# Patient Record
Sex: FEMALE | Race: WHITE | NOT HISPANIC OR LATINO | Employment: FULL TIME | URBAN - METROPOLITAN AREA
[De-identification: names, ages, dates, MRNs, and addresses within clinical notes are randomized per-mention and may not be internally consistent; named-entity substitution may affect disease eponyms.]

---

## 2019-11-14 ENCOUNTER — APPOINTMENT (OUTPATIENT)
Dept: ULTRASOUND IMAGING | Facility: CLINIC | Age: 29
End: 2019-11-14
Attending: EMERGENCY MEDICINE
Payer: COMMERCIAL

## 2019-11-14 ENCOUNTER — HOSPITAL ENCOUNTER (EMERGENCY)
Facility: CLINIC | Age: 29
Discharge: HOME | End: 2019-11-14
Attending: EMERGENCY MEDICINE | Admitting: EMERGENCY MEDICINE
Payer: COMMERCIAL

## 2019-11-14 ENCOUNTER — APPOINTMENT (OUTPATIENT)
Dept: CT IMAGING | Facility: CLINIC | Age: 29
End: 2019-11-14
Attending: EMERGENCY MEDICINE
Payer: COMMERCIAL

## 2019-11-14 VITALS
HEIGHT: 64 IN | OXYGEN SATURATION: 98 % | TEMPERATURE: 97.9 F | HEART RATE: 67 BPM | WEIGHT: 119 LBS | DIASTOLIC BLOOD PRESSURE: 56 MMHG | RESPIRATION RATE: 18 BRPM | BODY MASS INDEX: 20.32 KG/M2 | SYSTOLIC BLOOD PRESSURE: 99 MMHG

## 2019-11-14 DIAGNOSIS — R10.84 ABDOMINAL PAIN, GENERALIZED: ICD-10-CM

## 2019-11-14 LAB
ALBUMIN SERPL-MCNC: 4.5 G/DL (ref 3.4–5)
ALBUMIN UR-MCNC: NEGATIVE MG/DL
ALP SERPL-CCNC: 49 U/L (ref 40–150)
ALT SERPL W P-5'-P-CCNC: 13 U/L (ref 0–50)
ANION GAP SERPL CALCULATED.3IONS-SCNC: 3 MMOL/L (ref 3–14)
APPEARANCE UR: CLEAR
AST SERPL W P-5'-P-CCNC: 11 U/L (ref 0–45)
BASOPHILS # BLD AUTO: 0 10E9/L (ref 0–0.2)
BASOPHILS NFR BLD AUTO: 0.4 %
BILIRUB SERPL-MCNC: 0.5 MG/DL (ref 0.2–1.3)
BILIRUB UR QL STRIP: NEGATIVE
BUN SERPL-MCNC: 11 MG/DL (ref 7–30)
CALCIUM SERPL-MCNC: 9.6 MG/DL (ref 8.5–10.1)
CHLORIDE SERPL-SCNC: 105 MMOL/L (ref 94–109)
CO2 SERPL-SCNC: 29 MMOL/L (ref 20–32)
COLOR UR AUTO: ABNORMAL
CREAT SERPL-MCNC: 0.81 MG/DL (ref 0.52–1.04)
DIFFERENTIAL METHOD BLD: NORMAL
EOSINOPHIL # BLD AUTO: 0 10E9/L (ref 0–0.7)
EOSINOPHIL NFR BLD AUTO: 0.4 %
ERYTHROCYTE [DISTWIDTH] IN BLOOD BY AUTOMATED COUNT: 11.4 % (ref 10–15)
GFR SERPL CREATININE-BSD FRML MDRD: >90 ML/MIN/{1.73_M2}
GLUCOSE SERPL-MCNC: 120 MG/DL (ref 70–99)
GLUCOSE UR STRIP-MCNC: NEGATIVE MG/DL
HCG SERPL QL: NEGATIVE
HCT VFR BLD AUTO: 44.5 % (ref 35–47)
HGB BLD-MCNC: 15.1 G/DL (ref 11.7–15.7)
HGB UR QL STRIP: NEGATIVE
HYALINE CASTS #/AREA URNS LPF: 1 /LPF (ref 0–2)
IMM GRANULOCYTES # BLD: 0 10E9/L (ref 0–0.4)
IMM GRANULOCYTES NFR BLD: 0.3 %
KETONES UR STRIP-MCNC: 20 MG/DL
LACTATE BLD-SCNC: 1.8 MMOL/L (ref 0.7–2)
LEUKOCYTE ESTERASE UR QL STRIP: NEGATIVE
LYMPHOCYTES # BLD AUTO: 2.7 10E9/L (ref 0.8–5.3)
LYMPHOCYTES NFR BLD AUTO: 33.7 %
MCH RBC QN AUTO: 30.8 PG (ref 26.5–33)
MCHC RBC AUTO-ENTMCNC: 33.9 G/DL (ref 31.5–36.5)
MCV RBC AUTO: 91 FL (ref 78–100)
MONOCYTES # BLD AUTO: 0.5 10E9/L (ref 0–1.3)
MONOCYTES NFR BLD AUTO: 5.9 %
NEUTROPHILS # BLD AUTO: 4.8 10E9/L (ref 1.6–8.3)
NEUTROPHILS NFR BLD AUTO: 59.3 %
NITRATE UR QL: NEGATIVE
NRBC # BLD AUTO: 0 10*3/UL
NRBC BLD AUTO-RTO: 0 /100
PH UR STRIP: 6.5 PH (ref 5–7)
PLATELET # BLD AUTO: 274 10E9/L (ref 150–450)
POTASSIUM SERPL-SCNC: 3.6 MMOL/L (ref 3.4–5.3)
PROT SERPL-MCNC: 8.1 G/DL (ref 6.8–8.8)
RBC # BLD AUTO: 4.9 10E12/L (ref 3.8–5.2)
RBC #/AREA URNS AUTO: 1 /HPF (ref 0–2)
SODIUM SERPL-SCNC: 137 MMOL/L (ref 133–144)
SOURCE: ABNORMAL
SP GR UR STRIP: 1.01 (ref 1–1.03)
UROBILINOGEN UR STRIP-MCNC: NORMAL MG/DL (ref 0–2)
WBC # BLD AUTO: 8 10E9/L (ref 4–11)
WBC #/AREA URNS AUTO: <1 /HPF (ref 0–5)

## 2019-11-14 PROCEDURE — 84703 CHORIONIC GONADOTROPIN ASSAY: CPT | Performed by: EMERGENCY MEDICINE

## 2019-11-14 PROCEDURE — 25000128 H RX IP 250 OP 636: Performed by: EMERGENCY MEDICINE

## 2019-11-14 PROCEDURE — 25800030 ZZH RX IP 258 OP 636: Performed by: EMERGENCY MEDICINE

## 2019-11-14 PROCEDURE — 85025 COMPLETE CBC W/AUTO DIFF WBC: CPT | Performed by: EMERGENCY MEDICINE

## 2019-11-14 PROCEDURE — 80053 COMPREHEN METABOLIC PANEL: CPT | Performed by: EMERGENCY MEDICINE

## 2019-11-14 PROCEDURE — 96374 THER/PROPH/DIAG INJ IV PUSH: CPT | Mod: 59

## 2019-11-14 PROCEDURE — 83605 ASSAY OF LACTIC ACID: CPT | Performed by: EMERGENCY MEDICINE

## 2019-11-14 PROCEDURE — 25000125 ZZHC RX 250: Performed by: EMERGENCY MEDICINE

## 2019-11-14 PROCEDURE — 93976 VASCULAR STUDY: CPT

## 2019-11-14 PROCEDURE — 99285 EMERGENCY DEPT VISIT HI MDM: CPT | Mod: 25

## 2019-11-14 PROCEDURE — 96376 TX/PRO/DX INJ SAME DRUG ADON: CPT

## 2019-11-14 PROCEDURE — 81001 URINALYSIS AUTO W/SCOPE: CPT | Performed by: EMERGENCY MEDICINE

## 2019-11-14 PROCEDURE — 96375 TX/PRO/DX INJ NEW DRUG ADDON: CPT

## 2019-11-14 PROCEDURE — 96361 HYDRATE IV INFUSION ADD-ON: CPT

## 2019-11-14 PROCEDURE — 74177 CT ABD & PELVIS W/CONTRAST: CPT

## 2019-11-14 RX ORDER — HYDROCODONE BITARTRATE AND ACETAMINOPHEN 5; 325 MG/1; MG/1
1 TABLET ORAL EVERY 6 HOURS PRN
Qty: 10 TABLET | Refills: 0 | Status: SHIPPED | OUTPATIENT
Start: 2019-11-14 | End: 2019-11-17

## 2019-11-14 RX ORDER — IOPAMIDOL 755 MG/ML
500 INJECTION, SOLUTION INTRAVASCULAR ONCE
Status: COMPLETED | OUTPATIENT
Start: 2019-11-14 | End: 2019-11-14

## 2019-11-14 RX ORDER — MORPHINE SULFATE 4 MG/ML
4 INJECTION, SOLUTION INTRAMUSCULAR; INTRAVENOUS
Status: DISCONTINUED | OUTPATIENT
Start: 2019-11-14 | End: 2019-11-14 | Stop reason: HOSPADM

## 2019-11-14 RX ORDER — ONDANSETRON 2 MG/ML
4 INJECTION INTRAMUSCULAR; INTRAVENOUS EVERY 30 MIN PRN
Status: COMPLETED | OUTPATIENT
Start: 2019-11-14 | End: 2019-11-14

## 2019-11-14 RX ORDER — ONDANSETRON 4 MG/1
4 TABLET, ORALLY DISINTEGRATING ORAL EVERY 6 HOURS PRN
Qty: 10 TABLET | Refills: 0 | Status: SHIPPED | OUTPATIENT
Start: 2019-11-14 | End: 2019-11-17

## 2019-11-14 RX ADMIN — ONDANSETRON HYDROCHLORIDE 4 MG: 2 INJECTION, SOLUTION INTRAMUSCULAR; INTRAVENOUS at 02:07

## 2019-11-14 RX ADMIN — SODIUM CHLORIDE 55 ML: 9 INJECTION, SOLUTION INTRAVENOUS at 02:59

## 2019-11-14 RX ADMIN — MORPHINE SULFATE 4 MG: 4 INJECTION INTRAVENOUS at 04:20

## 2019-11-14 RX ADMIN — MORPHINE SULFATE 4 MG: 4 INJECTION INTRAVENOUS at 02:07

## 2019-11-14 RX ADMIN — ONDANSETRON HYDROCHLORIDE 4 MG: 2 INJECTION, SOLUTION INTRAMUSCULAR; INTRAVENOUS at 04:19

## 2019-11-14 RX ADMIN — IOPAMIDOL 60 ML: 755 INJECTION, SOLUTION INTRAVENOUS at 02:58

## 2019-11-14 RX ADMIN — SODIUM CHLORIDE 1000 ML: 9 INJECTION, SOLUTION INTRAVENOUS at 02:07

## 2019-11-14 RX ADMIN — ONDANSETRON HYDROCHLORIDE 4 MG: 2 INJECTION, SOLUTION INTRAMUSCULAR; INTRAVENOUS at 03:47

## 2019-11-14 ASSESSMENT — ENCOUNTER SYMPTOMS
DYSURIA: 0
NAUSEA: 1
ABDOMINAL PAIN: 1
FREQUENCY: 1
HEMATURIA: 0

## 2019-11-14 ASSESSMENT — MIFFLIN-ST. JEOR: SCORE: 1259.78

## 2019-11-14 NOTE — LETTER
November 14, 2019      To Whom It May Concern:      Bindu Mcleod was seen in our Emergency Department today, 11/14/19.  I expect her condition to improve over the next 2 days.  She may return to work when improved.    Sincerely,        KAYLA Lei

## 2019-11-14 NOTE — ED AVS SNAPSHOT
[facility] Emergency Department  [address]  [facility] [address]  Phone:  [phone]  Fax:  [phone]                                    Bindu Mcleod   MRN: [id number]    Department:  [facility] Emergency Department   Date of Visit:  11/14/2019           After Visit Summary Signature Page    I have received my discharge instructions, and my questions have been answered. I have discussed any challenges I see with this plan with the nurse or doctor.    ..........................................................................................................................................  Patient/Patient Representative Signature      ..........................................................................................................................................  Patient Representative Print Name and Relationship to Patient    ..................................................               ................................................  Date                                   Time    ..........................................................................................................................................  Reviewed by Signature/Title    ...................................................              ..............................................  Date                                               Time          22EPIC Rev 08/18

## 2019-11-14 NOTE — ED PROVIDER NOTES
"  History     Chief Complaint:  Abdominal Pain     HPI   Bindu Mcleod is a 29 year old female with a history of ovarian cyst rupture who presents with abdominal pain. At 2045 on 11/13 the patient reported abdominal pain in her right lower quadrant with associated nausea. She took ibuprofen at 2000 and Tums and Pepto bismol at 2100. Currently, her pain is located diffusely throughout her abdomen, mostly on her right side. She notes urinary frequency that began last night. She denies hematuria and dysuria. She states that she has had no abdominal surgeries.     Allergies:  Azithromycin    Medications:    The patient is not currently taking any prescribed medications.    Past Medical History:    Anxiety  Depression  Heart murmur  Ovarian cyst rupture    Past Surgical History:    Tonsillectomy   Eye surgery    Family History:    No past pertinent family history.    Social History:  Smoking Status: former smoker   Smokeless Tobacco: Never Used  Alcohol Use: Yes  Drug Use: No  PCP: Nelsy Ramirez  Marital Status:  Single    Review of Systems   Gastrointestinal: Positive for abdominal pain and nausea.   Genitourinary: Positive for frequency. Negative for dysuria and hematuria.   All other systems reviewed and are negative.        Physical Exam     Patient Vitals for the past 24 hrs:   BP Temp Temp src Pulse Heart Rate Resp SpO2 Height Weight   11/14/19 0430 104/60 -- -- 69 -- -- 100 % -- --   11/14/19 0425 106/65 -- -- 73 -- -- 100 % -- --   11/14/19 0420 -- -- -- -- -- -- 100 % -- --   11/14/19 0415 105/66 -- -- 69 -- -- 100 % -- --   11/14/19 0405 107/65 -- -- 67 -- -- 100 % -- --   11/14/19 0240 -- -- -- -- -- -- 100 % -- --   11/14/19 0140 127/66 97.9  F (36.6  C) Oral 75 75 18 99 % 1.626 m (5' 4\") 54 kg (119 lb)       Physical Exam  Constitutional:       Appearance: She is well-developed.   HENT:      Right Ear: External ear normal.      Left Ear: External ear normal.      Mouth/Throat:      Mouth: Mucous " membranes are moist.      Pharynx: Oropharynx is clear. No oropharyngeal exudate.   Eyes:      General: No scleral icterus.     Conjunctiva/sclera: Conjunctivae normal.      Pupils: Pupils are equal, round, and reactive to light.   Neck:      Musculoskeletal: Normal range of motion and neck supple.   Cardiovascular:      Rate and Rhythm: Normal rate and regular rhythm.      Pulses: Normal pulses.      Heart sounds: Normal heart sounds. No murmur. No friction rub. No gallop.    Pulmonary:      Effort: Pulmonary effort is normal. No respiratory distress.      Breath sounds: Normal breath sounds. No wheezing or rales.   Abdominal:      General: Bowel sounds are normal. There is no distension.      Palpations: Abdomen is soft. There is no mass.      Tenderness: There is generalized abdominal tenderness and tenderness in the right upper quadrant, right lower quadrant, periumbilical area, suprapubic area and left lower quadrant.   Skin:     General: Skin is warm and dry.      Capillary Refill: Capillary refill takes less than 2 seconds.      Findings: No rash.   Neurological:      General: No focal deficit present.      Mental Status: She is alert.   Psychiatric:         Mood and Affect: Mood normal.             Emergency Department Course     Imaging:  Radiology findings were communicated with the patient who voiced understanding of the findings.    US Pelvic Complete w Transvaginal & Abd/Pel Duplex Limited  1.  Trace of pelvic free fluid.  2.  Left ovary obscured by bowel gas.  Reading per radiology    CT Abdomen Pelvis w Contrast  1.  Equivocal rectal wall thickening. This may be physiologic but mild proctitis is difficult to completely exclude.  2.  Trace free fluid in the pelvis. No other acute cause of pain identified.  Reading per radiology    Laboratory:  Laboratory findings were communicated with the patient who voiced understanding of the findings.    CBC: WBC 8.0, HGB 15.1,   CMP: glucose 120 (H) o/w WNL  (Creatinine 0.81)  Lactic acid: 1.8  HCG qualitative: negative    UA with microscopic: urineketon 20 o/w WNL    Interventions:  0207 NS bolus 1,000 ml IV  0207 zofran 4 mg IV  0207 morphine 4 mg IV  0347 zofran 4 mg IV  0419 zofran 4 mg IV  0420 morphine 4 mg IV    Emergency Department Course:  Nursing notes and vitals reviewed.    0142 I performed an exam of the patient as documented above.     The patient provided a urine sample here in the emergency department. This was sent for laboratory testing, findings above.    IV was inserted and blood was drawn for laboratory testing, results above.    The patient was sent for a US pelvic complete and CT abdomen and pelvis while in the emergency department, results above.     0451 I returned to update the patient regarding their discharge.    Findings and plan explained to the Patient. Patient discharged home with instructions regarding supportive care, medications, and reasons to return. The importance of close follow-up was reviewed. The patient was prescribed norco and zofran.     Impression & Plan      Medical Decision Making:  Bindu Mcleod is a 29 year old female who presents to the emergency department today for evaluation of abdominal pain and nausea. She pointed to her right side but on exam it was diffuse right and left sided abdominal pain. Labs were reassuring the CBC, CMP, and UA are all completely clean. The CT and US were negative. I am not exactly sure what is causing the pain but her symptoms have improved and she was asleep on repeat evaluation. The patient is instructed to follow up with her primary care provider tomorrow and she is prescribed zofran and norco for the pain. She is to keep close eye on the return precautions that were given.     Diagnosis:    ICD-10-CM    1. Abdominal pain, generalized R10.84        Disposition:   The patient is discharged to home.    Discharge Medications:  New Prescriptions    HYDROCODONE-ACETAMINOPHEN (NORCO)  5-325 MG TABLET    Take 1 tablet by mouth every 6 hours as needed for severe pain    ONDANSETRON (ZOFRAN ODT) 4 MG ODT TAB    Take 1 tablet (4 mg) by mouth every 6 hours as needed for nausea       Scribe Disclosure:  SENG, Flor Luther, am serving as a scribe at 1:53 AM on 11/14/2019 to document services personally performed by Rachael Storey MD based on my observations and the provider's statements to me.   [facility] EMERGENCY DEPARTMENT       Rachael Storey MD  11/14/19 0544

## 2019-11-14 NOTE — ED TRIAGE NOTES
Here for right lower abdominal started around 8:45pm that got worse associated with nausea, light headedness, and chills. Pain improve with laying on left side. ABCs intact.

## 2019-11-14 NOTE — DISCHARGE INSTRUCTIONS
Zofran as needed  Opioid Medication Information    You have been given a prescription for an opioid (narcotic) pain medicine and/or have received a pain medicine while here in the Emergency Department. These medicines can make you drowsy or impaired. You must not drive, operate dangerous equipment, or engage in any other dangerous activities while taking these medications. If you drive while taking these medications, you could be arrested for DUI, or driving under the influence. Do not drink any alcohol while you are taking these medications.   Opioid pain medications can cause addiction. If you have a history of chemical dependency of any type, you are at a higher risk of becoming addicted to pain medications.  Only take these prescribed medications to treat your pain when all other options have been tried. Take it for as short a time and as few doses as possible. Store your pain pills in a secure place, as they are frequently stolen and provide a dangerous opportunity for children or visitors in your house to start abusing these powerful medications. We will not replace any lost or stolen medicine.  As soon as your pain is better, you should flush all your remaining medication.   Many prescription pain medications contain Tylenol  (acetaminophen), including Vicodin , Tylenol #3 , Norco , Lortab , and Percocet .  You should not take any extra pills of Tylenol  if you are using these prescription medications or you can get very sick.  Do not ever take more than 4000 mg of acetaminophen in any 24 hour period.  All opioids tend to cause constipation. Drink plenty of water and eat foods that have a lot of fiber, such as fruits, vegetables, prune juice, apple juice and high fiber cereal.  Take a laxative if you don t move your bowels at least every other day. Miralax , Milk of Magnesia, Colace , or Senna  can be used to keep you regular.

## 2022-12-11 ENCOUNTER — HOSPITAL ENCOUNTER (EMERGENCY)
Facility: CLINIC | Age: 32
Discharge: HOME | End: 2022-12-11
Attending: PHYSICIAN ASSISTANT | Admitting: PHYSICIAN ASSISTANT
Payer: COMMERCIAL

## 2022-12-11 VITALS
BODY MASS INDEX: 20.6 KG/M2 | OXYGEN SATURATION: 97 % | SYSTOLIC BLOOD PRESSURE: 109 MMHG | DIASTOLIC BLOOD PRESSURE: 52 MMHG | WEIGHT: 120 LBS | TEMPERATURE: 98.8 F | RESPIRATION RATE: 18 BRPM | HEART RATE: 91 BPM

## 2022-12-11 DIAGNOSIS — J10.1 INFLUENZA A: ICD-10-CM

## 2022-12-11 LAB
FLUAV RNA SPEC QL NAA+PROBE: POSITIVE
FLUBV RNA RESP QL NAA+PROBE: NEGATIVE
RSV RNA SPEC NAA+PROBE: NEGATIVE
SARS-COV-2 RNA RESP QL NAA+PROBE: NEGATIVE

## 2022-12-11 PROCEDURE — C9803 HOPD COVID-19 SPEC COLLECT: HCPCS

## 2022-12-11 PROCEDURE — 99283 EMERGENCY DEPT VISIT LOW MDM: CPT | Mod: CS

## 2022-12-11 PROCEDURE — 87637 SARSCOV2&INF A&B&RSV AMP PRB: CPT | Performed by: EMERGENCY MEDICINE

## 2022-12-11 RX ORDER — OSELTAMIVIR PHOSPHATE 75 MG/1
75 CAPSULE ORAL 2 TIMES DAILY
Qty: 10 CAPSULE | Refills: 0 | Status: SHIPPED | OUTPATIENT
Start: 2022-12-11 | End: 2022-12-16

## 2022-12-11 ASSESSMENT — ENCOUNTER SYMPTOMS
COUGH: 1
HEADACHES: 1

## 2022-12-11 ASSESSMENT — ACTIVITIES OF DAILY LIVING (ADL): ADLS_ACUITY_SCORE: 35

## 2022-12-12 NOTE — ED TRIAGE NOTES
Patient arrives complaining of fever, cough, and congestion with headache. Max fever 100 temp at home. Pt is 13 weeks pregnant. Not taking tylenol. Declines tylenol in triage.     No audible wheezes in triage.      Triage Assessment     Row Name 12/11/22 1911       Triage Assessment (Adult)    Airway WDL WDL       Respiratory WDL    Respiratory WDL X;cough  cough, congestion       Skin Circulation/Temperature WDL    Skin Circulation/Temperature WDL WDL       Cardiac WDL    Cardiac WDL WDL       Peripheral/Neurovascular WDL    Peripheral Neurovascular WDL WDL       Cognitive/Neuro/Behavioral WDL    Cognitive/Neuro/Behavioral WDL WDL

## 2022-12-12 NOTE — ED PROVIDER NOTES
History     Chief Complaint:  Cough       HPI   Bindu Mcleod is a 32 year old female who presents with headache and cough. Symptoms have been present for 2 days. Patient is 13 weeks pregnant with first child. No known pregnancy complications. Patient has not been taking any medication. Patient is able to tolerate liquids and is using bathroom regularly. No vomiting, diarrhea or rash. 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 She denies chest pain, shortness of breath, abdominal pain or cramps, back pain, or vaginal discharge.     ROS:  Review of Systems   Respiratory: Positive for cough.    Neurological: Positive for headaches.   All other systems reviewed and are negative.    Allergies:  Azithromycin     Medications:    oseltamivir (TAMIFLU) 75 MG capsule  ondansetron (ZOFRAN) 4 MG tablet        Past Medical History:    Past Medical History:   Diagnosis Date     Anxiety      Depression      Heart murmur      Ovarian cyst rupture        Past Surgical History:    Past Surgical History:   Procedure Laterality Date     EYE SURGERY       tonsilectomy          Family History:    family history is not on file.    Social History:   reports that she has quit smoking. She does not have any smokeless tobacco history on file. She reports current alcohol use. She reports that she does not use drugs.  PCP: Cara Figueroa     Physical Exam     Patient Vitals for the past 24 hrs:   BP Temp Temp src Pulse Resp SpO2 Weight   12/11/22 1911 119/69 98.8  F (37.1  C) Oral 117 20 98 % 54.4 kg (120 lb)        Physical Exam  Constitutional: Alert, attentive, GCS 15  HENT:    Nose: Nose normal.    Mouth/Throat: Oropharynx is clear, mucous membranes are moist   Eyes: EOM are normal.   CV: regular rate and rhythm; no murmurs, rubs or gallups  Chest: Effort normal and breath sounds normal. No wheezing or crackles. Dry cough.  GI:  There is no tenderness. No distension. Normal bowel sounds  MSK: Normal  range of motion. No lower extremity edema.  Neurological: Alert, attentive  Skin: Skin is warm and dry.      Emergency Department Course     Laboratory:  Labs Ordered and Resulted from Time of ED Arrival to Time of ED Departure   INFLUENZA A/B & SARS-COV2 PCR MULTIPLEX - Abnormal       Result Value    Influenza A PCR Positive (*)     Influenza B PCR Negative      RSV PCR Negative      SARS CoV2 PCR Negative          Emergency Department Course:    Reviewed:  I reviewed nursing notes, vitals and past medical history    Assessments:  2015 I obtained history and examined the patient as noted above. Laboratory results discussed.    Interventions:  Medications - No data to display     Disposition:  The patient was discharged to home.     Impression & Plan    CMS Diagnoses: None    Medical Decision Making:  Patient is a well appearing 32 year old F who is 13 weeks pregnant and presents with history and exam consistent with viral illness. Viral swab is positive for Influenza A.  There is no evidence of serious bacterial infection such as pneumonia or acute otitis media. No suspicious abdominal or vaginal signs or symptoms. There is no significant tachypnea, increased work of breathing, focal exam findings, or persistent symptoms to suggest pneumonia; I do not believe imaging is indicated at this time. The patient is afebrile in the department. She is well-hydrated, well-appearing, and I believe is safe for discharge with plan for supportive care. Patient is in high risk category due to pregnancy status and would benefit from Tamiflu. The patient may take Tylenol or ibuprofen for pain and fever. Patient has scheduled OB appointment tomorrow which is appropriate. Supportive cares and return precautions to ED discussed. Patient expressed understanding of plan and was ready for discharge.    Diagnosis:    ICD-10-CM    1. Influenza A  J10.1            Discharge Medications:  New Prescriptions    OSELTAMIVIR (TAMIFLU) 75 MG  CAPSULE    Take 1 capsule (75 mg) by mouth 2 times daily for 5 days        12/11/2022   Adriana Rai PA-C Steinbrueck, Emily, PA-C  12/11/22 0832

## 2023-02-20 ENCOUNTER — HOSPITAL ENCOUNTER (OUTPATIENT)
Facility: CLINIC | Age: 33
Discharge: HOME | End: 2023-02-20
Attending: OBSTETRICS & GYNECOLOGY | Admitting: OBSTETRICS & GYNECOLOGY
Payer: COMMERCIAL

## 2023-02-20 ENCOUNTER — HOSPITAL ENCOUNTER (OUTPATIENT)
Facility: CLINIC | Age: 33
End: 2023-02-20
Admitting: OBSTETRICS & GYNECOLOGY
Payer: COMMERCIAL

## 2023-02-20 VITALS
HEIGHT: 64 IN | WEIGHT: 127 LBS | DIASTOLIC BLOOD PRESSURE: 54 MMHG | SYSTOLIC BLOOD PRESSURE: 91 MMHG | TEMPERATURE: 98.1 F | RESPIRATION RATE: 18 BRPM | BODY MASS INDEX: 21.68 KG/M2

## 2023-02-20 LAB
ALBUMIN MFR UR ELPH: <6 MG/DL (ref 1–14)
ALBUMIN SERPL BCG-MCNC: 3.6 G/DL (ref 3.5–5.2)
ALP SERPL-CCNC: 50 U/L (ref 35–104)
ALT SERPL W P-5'-P-CCNC: 18 U/L (ref 10–35)
ANION GAP SERPL CALCULATED.3IONS-SCNC: 9 MMOL/L (ref 7–15)
AST SERPL W P-5'-P-CCNC: 14 U/L (ref 10–35)
BILIRUB SERPL-MCNC: <0.2 MG/DL
BUN SERPL-MCNC: 9.5 MG/DL (ref 6–20)
CALCIUM SERPL-MCNC: 8.9 MG/DL (ref 8.6–10)
CHLORIDE SERPL-SCNC: 103 MMOL/L (ref 98–107)
CREAT SERPL-MCNC: 0.63 MG/DL (ref 0.51–0.95)
CREAT UR-MCNC: 14.9 MG/DL
DEPRECATED HCO3 PLAS-SCNC: 26 MMOL/L (ref 22–29)
ERYTHROCYTE [DISTWIDTH] IN BLOOD BY AUTOMATED COUNT: 12.2 % (ref 10–15)
GFR SERPL CREATININE-BSD FRML MDRD: >90 ML/MIN/1.73M2
GLUCOSE SERPL-MCNC: 98 MG/DL (ref 70–99)
HCT VFR BLD AUTO: 37.7 % (ref 35–47)
HGB BLD-MCNC: 12.2 G/DL (ref 11.7–15.7)
MCH RBC QN AUTO: 31 PG (ref 26.5–33)
MCHC RBC AUTO-ENTMCNC: 32.4 G/DL (ref 31.5–36.5)
MCV RBC AUTO: 96 FL (ref 78–100)
PLATELET # BLD AUTO: 222 10E3/UL (ref 150–450)
POTASSIUM SERPL-SCNC: 4.4 MMOL/L (ref 3.4–5.3)
PROT SERPL-MCNC: 6.5 G/DL (ref 6.4–8.3)
PROT/CREAT 24H UR: NORMAL MG/G{CREAT}
RBC # BLD AUTO: 3.93 10E6/UL (ref 3.8–5.2)
SODIUM SERPL-SCNC: 138 MMOL/L (ref 136–145)
WBC # BLD AUTO: 7.6 10E3/UL (ref 4–11)

## 2023-02-20 PROCEDURE — 250N000013 HC RX MED GY IP 250 OP 250 PS 637: Performed by: OBSTETRICS & GYNECOLOGY

## 2023-02-20 PROCEDURE — G0463 HOSPITAL OUTPT CLINIC VISIT: HCPCS

## 2023-02-20 PROCEDURE — 84156 ASSAY OF PROTEIN URINE: CPT | Performed by: OBSTETRICS & GYNECOLOGY

## 2023-02-20 PROCEDURE — 36415 COLL VENOUS BLD VENIPUNCTURE: CPT | Performed by: OBSTETRICS & GYNECOLOGY

## 2023-02-20 PROCEDURE — 80053 COMPREHEN METABOLIC PANEL: CPT | Performed by: OBSTETRICS & GYNECOLOGY

## 2023-02-20 PROCEDURE — 85014 HEMATOCRIT: CPT | Performed by: OBSTETRICS & GYNECOLOGY

## 2023-02-20 RX ORDER — ASPIRIN 81 MG/1
81 TABLET, CHEWABLE ORAL DAILY
Status: ON HOLD | COMMUNITY
End: 2023-06-28

## 2023-02-20 RX ORDER — ACETAMINOPHEN 500 MG
1000 TABLET ORAL ONCE
Status: COMPLETED | OUTPATIENT
Start: 2023-02-20 | End: 2023-02-20

## 2023-02-20 RX ORDER — LIDOCAINE 40 MG/G
CREAM TOPICAL
Status: DISCONTINUED | OUTPATIENT
Start: 2023-02-20 | End: 2023-02-20 | Stop reason: HOSPADM

## 2023-02-20 RX ORDER — METOCLOPRAMIDE 10 MG/1
10 TABLET ORAL ONCE
Status: COMPLETED | OUTPATIENT
Start: 2023-02-20 | End: 2023-02-20

## 2023-02-20 RX ADMIN — ACETAMINOPHEN 1000 MG: 500 TABLET ORAL at 12:59

## 2023-02-20 RX ADMIN — METOCLOPRAMIDE 10 MG: 10 TABLET ORAL at 14:15

## 2023-02-20 ASSESSMENT — ACTIVITIES OF DAILY LIVING (ADL)
ADLS_ACUITY_SCORE: 35
ADLS_ACUITY_SCORE: 35

## 2023-02-20 NOTE — PROVIDER NOTIFICATION
02/20/23 1344   Provider Notification   Provider Name/Title    Method of Notification Phone   Request Evaluate - Remote     Patient received ice pack and Tylenol at 1300 for headache. Admits to no relief, states vision is still blurry, no change. Plan per provider-  CMP, CBC with platelets and send a urine for protein/creatinine ratio.

## 2023-02-20 NOTE — DISCHARGE INSTRUCTIONS
Discharge Instruction for Undelivered Patients      You were seen for:  headache and blurry vision  We Consulted:   You had (Test or Medicine):Fetal heart rate by Doptone, lab work, serial BP's and medication for headache.      Diet:   Drink 8 to 12 glasses of liquids (milk, juice, water) every day.  You may eat meals and snacks.     Activity:  Call your doctor or nurse midwife if your baby is moving less than usual.     Call your provider if you notice:  Swelling in your face or increased swelling in your hands or legs.  Headaches that are not relieved by Tylenol (acetaminophen).  Changes in your vision (blurring: seeing spots or stars.)  Nausea (sick to your stomach) and vomiting (throwing up).   Weight gain of 5 pounds or more per week.  Heartburn that doesn't go away.  Signs of bladder infection: pain when you urinate (use the toilet), need to go more often and more urgently.  The bag of jacob (rupture of membranes) breaks, or you notice leaking in your underwear.  Bright red blood in your underwear.  Abdominal (lower belly) or stomach pain.  For first baby: Contractions (tightening) less than 5 minutes apart for one hour or more.  Second (plus) baby: Contractions (tightening) less than 10 minutes apart and getting stronger.  *If less than 34 weeks: Contractions (tightening) more than 6 times in one hour.  Increase or change in vaginal discharge (note the color and amount)  Other: Call if increase pain or concerns.    Follow-up:  As scheduled in the clinic

## 2023-02-20 NOTE — PLAN OF CARE
States headache is a littler better, rates at a 4 and no change in vision, it remains blurry. Pending lab results.

## 2023-02-20 NOTE — PLAN OF CARE
Labs reviewed, WNL. Discharge instructions reviewed with patient, understanding verbalized. Discharged to home ambulatory with SO.

## 2023-02-20 NOTE — PROVIDER NOTIFICATION
23 1235   Provider Notification   Provider Name/Title    Method of Notification Phone   Request Evaluate - Remote   Data: [facility]: 2023 11:56 AM.  Reason for maternal/fetal assessment is headache and blurry vision. Patient reports Headache since Saturday middle of the night, rates at a 6 and blurry vision since last evening. Admits to some intermittent pain above navel and at right edge of right ribs and some dizziness when getting up the past 2 weeks or so. Patient is a .  Prenatal record reviewed. Patient has a dilated pulmonary artery, stable since age 21 and sister delivered at 29 weeks due to preeclampsia. Patellar reflexes +3, no edema.   Gestational Age 22w5d. VSS. Fetal movement active. Patient denies abdominal pain, nausea, vomiting, epigastric or URQ pain, significant edema. Support person is present.   Action: Verbal consent for EFM. Triage assessment completed. Bill of rights reviewed.  Response: Patient verbalized agreement with plan. Dr Leeann Chan contacted with update. Order received for Tylenol 1,000 mg PO, may have ice pack for head and a regular diet. No orders for labs at this time.

## 2023-02-20 NOTE — PROVIDER NOTIFICATION
02/20/23 1502   Provider Notification   Provider Name/Title    Method of Notification In Department   Request Evaluate in Person     Updated regarding previous note. Urine result review. Plan per provider- if CMP and CBC normal, discharge patient to home. Patient to call if increase pain or concerns and follow-up at her next scheduled appointment.

## 2023-05-12 ENCOUNTER — HOSPITAL ENCOUNTER (OUTPATIENT)
Facility: CLINIC | Age: 33
Discharge: HOME | End: 2023-05-12
Attending: OBSTETRICS & GYNECOLOGY | Admitting: OBSTETRICS & GYNECOLOGY
Payer: COMMERCIAL

## 2023-05-12 VITALS
SYSTOLIC BLOOD PRESSURE: 109 MMHG | DIASTOLIC BLOOD PRESSURE: 58 MMHG | BODY MASS INDEX: 26.22 KG/M2 | WEIGHT: 148 LBS | HEIGHT: 63 IN | TEMPERATURE: 98.9 F

## 2023-05-12 LAB
ALBUMIN UR-MCNC: 10 MG/DL
APPEARANCE UR: CLEAR
BILIRUB UR QL STRIP: NEGATIVE
CLUE CELLS: PRESENT
COLOR UR AUTO: YELLOW
GLUCOSE UR STRIP-MCNC: NEGATIVE MG/DL
HGB UR QL STRIP: NEGATIVE
KETONES UR STRIP-MCNC: NEGATIVE MG/DL
LEUKOCYTE ESTERASE UR QL STRIP: NEGATIVE
MUCOUS THREADS #/AREA URNS LPF: PRESENT /LPF
NITRATE UR QL: NEGATIVE
PH UR STRIP: 6 [PH] (ref 5–7)
RBC URINE: 1 /HPF
SP GR UR STRIP: 1.03 (ref 1–1.03)
SQUAMOUS EPITHELIAL: <1 /HPF
TRICHOMONAS, WET PREP: ABNORMAL
UROBILINOGEN UR STRIP-MCNC: NORMAL MG/DL
WBC URINE: <1 /HPF
WBC'S/HIGH POWER FIELD, WET PREP: ABNORMAL
YEAST, WET PREP: ABNORMAL

## 2023-05-12 PROCEDURE — G0463 HOSPITAL OUTPT CLINIC VISIT: HCPCS | Mod: 25

## 2023-05-12 PROCEDURE — 87210 SMEAR WET MOUNT SALINE/INK: CPT | Performed by: OBSTETRICS & GYNECOLOGY

## 2023-05-12 PROCEDURE — 81001 URINALYSIS AUTO W/SCOPE: CPT | Performed by: OBSTETRICS & GYNECOLOGY

## 2023-05-12 PROCEDURE — 59025 FETAL NON-STRESS TEST: CPT | Mod: 59

## 2023-05-12 PROCEDURE — 59025 FETAL NON-STRESS TEST: CPT

## 2023-05-12 RX ORDER — PROCHLORPERAZINE MALEATE 10 MG
10 TABLET ORAL EVERY 6 HOURS PRN
Status: DISCONTINUED | OUTPATIENT
Start: 2023-05-12 | End: 2023-05-12 | Stop reason: HOSPADM

## 2023-05-12 RX ORDER — ONDANSETRON 4 MG/1
4 TABLET, ORALLY DISINTEGRATING ORAL EVERY 6 HOURS PRN
Status: DISCONTINUED | OUTPATIENT
Start: 2023-05-12 | End: 2023-05-12 | Stop reason: HOSPADM

## 2023-05-12 RX ORDER — METOCLOPRAMIDE HYDROCHLORIDE 5 MG/ML
10 INJECTION INTRAMUSCULAR; INTRAVENOUS EVERY 6 HOURS PRN
Status: DISCONTINUED | OUTPATIENT
Start: 2023-05-12 | End: 2023-05-12 | Stop reason: HOSPADM

## 2023-05-12 RX ORDER — METOCLOPRAMIDE 10 MG/1
10 TABLET ORAL EVERY 6 HOURS PRN
Status: DISCONTINUED | OUTPATIENT
Start: 2023-05-12 | End: 2023-05-12 | Stop reason: HOSPADM

## 2023-05-12 RX ORDER — METRONIDAZOLE 500 MG/1
500 TABLET ORAL 2 TIMES DAILY
Qty: 14 TABLET | Refills: 0 | Status: SHIPPED | OUTPATIENT
Start: 2023-05-12 | End: 2023-05-19

## 2023-05-12 RX ORDER — ONDANSETRON 2 MG/ML
4 INJECTION INTRAMUSCULAR; INTRAVENOUS EVERY 6 HOURS PRN
Status: DISCONTINUED | OUTPATIENT
Start: 2023-05-12 | End: 2023-05-12 | Stop reason: HOSPADM

## 2023-05-12 RX ORDER — PROCHLORPERAZINE 25 MG
25 SUPPOSITORY, RECTAL RECTAL EVERY 12 HOURS PRN
Status: DISCONTINUED | OUTPATIENT
Start: 2023-05-12 | End: 2023-05-12 | Stop reason: HOSPADM

## 2023-05-12 ASSESSMENT — ACTIVITIES OF DAILY LIVING (ADL): ADLS_ACUITY_SCORE: 31

## 2023-05-12 NOTE — H&P
"Bindu Mcleod is a 32 year old  at 34 2//7  weeks presenting for evaluation of lower abdominal cramping x past two days.  No bleeding or discontinue, baby remains active.  works in a  setting.  Rates pain 7/10, none since arrival.     Points to lower bilateral pelvis, also some tenderness when pushing on left side of uterus--\"I think that is where the baby is\".  No regular contractions, nothing that encompasses whole abd or uterus..      Patient Active Problem List   Diagnosis     Labor and delivery, indication for care     OB History    Para Term  AB Living   1 0 0 0 0 0   SAB IAB Ectopic Multiple Live Births   0 0 0 0 0      # Outcome Date GA Lbr Venu/2nd Weight Sex Delivery Anes PTL Lv   1 Current                Past Medical History:   Diagnosis Date     Anxiety      Depression      Heart murmur      Ovarian cyst rupture          Current Facility-Administered Medications   Medication     metoclopramide (REGLAN) injection 10 mg    Or     metoclopramide (REGLAN) tablet 10 mg     ondansetron (ZOFRAN ODT) ODT tab 4 mg    Or     ondansetron (ZOFRAN) injection 4 mg     prochlorperazine (COMPAZINE) injection 10 mg    Or     prochlorperazine (COMPAZINE) tablet 10 mg    Or     prochlorperazine (COMPAZINE) suppository 25 mg     Allergies: azithromycin      OBJECTIVE:  Blood pressure 109/58, temperature 98.9  F (37.2  C), temperature source Oral, height 1.6 m (5' 3\"), weight 67.1 kg (148 lb), not currently breastfeeding.  WDWN gravid woman in NAD  Appears healthy and comfortable.  Tender over bilateral lower uterus, consistent with later pregnancy.  Nothing unusual.  Tender over left side, mild, and baby is on that side.  Uterus is soft.  No unusual or concerning tenderness on exam.    SVE closed/long/posterior/soft.    NST reactive   UA negative  Wet prep + for BV    toco without contractions    ASSESSMENT:  IUP 34 2/7 weeks  Lower uterine \"pain\" consistent with stretching of ASHLEY as end of " "pregnancy approaches.  Head is palpable in pelvis on vaginal exam, baby has \"dropped\".  Discussed with pt.  No signs of PTL, abruption, or danger.    + BV--will treat with oral flagyl.  Discussed with pt.    PLAN:  As above.  OK to work if she can take it easy and not in pain.  rx sent to her pharmacy.  Keep prenatal appts as scheduled.  Call or return if bleeding, or worsening pain, change in symptoms or questions.    Cassandra Mittal MD on 5/12/2023 at 11:03 AM      Cassandra Mittal MD May 12, 2023   "

## 2023-05-12 NOTE — DISCHARGE INSTRUCTIONS
Discharge Instruction for Undelivered Patients      You were seen for: Labor Assessment  We Consulted: Dr. Mittal  You had (Test or Medicine):Labs(wet prep/uA)     Diet:   Drink 8 to 12 glasses of liquids (milk, juice, water) every day.     Activity:  Call your doctor or nurse midwife if your baby is moving less than usual.     Call your provider if you notice:  Swelling in your face or increased swelling in your hands or legs.  Headaches that are not relieved by Tylenol (acetaminophen).  Changes in your vision (blurring: seeing spots or stars.)  Nausea (sick to your stomach) and vomiting (throwing up).   Weight gain of 5 pounds or more per week.  Heartburn that doesn't go away.  Signs of bladder infection: pain when you urinate (use the toilet), need to go more often and more urgently.  The bag of jacob (rupture of membranes) breaks, or you notice leaking in your underwear.  Bright red blood in your underwear.  Abdominal (lower belly) or stomach pain.  For first baby: Contractions (tightening) less than 5 minutes apart for one hour or more.  *If less than 34 weeks: Contractions (tightening) more than 6 times in one hour.  Increase or change in vaginal discharge (note the color and amount)  Other:     Follow-up:  As scheduled in the clinic

## 2023-05-12 NOTE — PROVIDER NOTIFICATION
05/12/23 0948   Provider Notification   Provider Name/Title Dr. Mittal   Method of Notification Electronic Page;Phone  (updated of pt hx and arrival, c/o cramping for last couple of days.)   Notification Reason Patient Arrived;Status Update       Dr Cassandra Mittal informed of patient arrival and assessment including the following:    Reason for maternal/fetal assessment abdominal cramping pain. Pt reports having cramping for couple days. Fetal status normal baseline, moderate variability, accelerations present and no decelerations. Plan per provider/orders received for wet prep/UA.    
   05/12/23 1040   Provider Notification   Provider Name/Title Dr. Mittal   Method of Notification Electronic Page;Phone   Notification Reason Status Update  (updated of urine results, wet prep +clue cells, dr will come to see pt.)       
   05/12/23 1045   Provider Notification   Provider Name/Title Dr. Mittal   Method of Notification At Bedside   Notification Reason SVE  (ok to discharge, sve closed)       
Hide Include Location In Plan Question?: No
Detail Level: Zone

## 2023-05-24 LAB — GROUP B STREPTOCOCCUS (EXTERNAL): NEGATIVE

## 2023-06-25 ENCOUNTER — HOSPITAL ENCOUNTER (INPATIENT)
Facility: CLINIC | Age: 33
LOS: 2 days | Discharge: HOME | End: 2023-06-28
Attending: OBSTETRICS & GYNECOLOGY | Admitting: OBSTETRICS & GYNECOLOGY
Payer: COMMERCIAL

## 2023-06-25 PROCEDURE — G0463 HOSPITAL OUTPT CLINIC VISIT: HCPCS

## 2023-06-26 ENCOUNTER — ANESTHESIA (OUTPATIENT)
Dept: OBGYN | Facility: CLINIC | Age: 33
End: 2023-06-26
Payer: COMMERCIAL

## 2023-06-26 ENCOUNTER — ANESTHESIA EVENT (OUTPATIENT)
Dept: OBGYN | Facility: CLINIC | Age: 33
End: 2023-06-26
Payer: COMMERCIAL

## 2023-06-26 LAB
ABO/RH(D): NORMAL
ANTIBODY SCREEN: NEGATIVE
CLUE CELLS: NORMAL
CRYSTALS AMN MICRO: NORMAL
HGB BLD-MCNC: 11.9 G/DL (ref 11.7–15.7)
RUPTURE OF FETAL MEMBRANES BY ROM PLUS: NEGATIVE
SPECIMEN EXPIRATION DATE: NORMAL
T PALLIDUM AB SER QL: NONREACTIVE
TRICHOMONAS, WET PREP: NORMAL
WBC'S/HIGH POWER FIELD, WET PREP: NORMAL
YEAST, WET PREP: NORMAL

## 2023-06-26 PROCEDURE — 87210 SMEAR WET MOUNT SALINE/INK: CPT | Performed by: OBSTETRICS & GYNECOLOGY

## 2023-06-26 PROCEDURE — 250N000009 HC RX 250: Performed by: OBSTETRICS & GYNECOLOGY

## 2023-06-26 PROCEDURE — 250N000013 HC RX MED GY IP 250 OP 250 PS 637: Performed by: OBSTETRICS & GYNECOLOGY

## 2023-06-26 PROCEDURE — 86901 BLOOD TYPING SEROLOGIC RH(D): CPT | Performed by: OBSTETRICS & GYNECOLOGY

## 2023-06-26 PROCEDURE — 86780 TREPONEMA PALLIDUM: CPT | Performed by: OBSTETRICS & GYNECOLOGY

## 2023-06-26 PROCEDURE — 85018 HEMOGLOBIN: CPT | Performed by: OBSTETRICS & GYNECOLOGY

## 2023-06-26 PROCEDURE — 250N000011 HC RX IP 250 OP 636: Performed by: ANESTHESIOLOGY

## 2023-06-26 PROCEDURE — 0UQMXZZ REPAIR VULVA, EXTERNAL APPROACH: ICD-10-PCS | Performed by: OBSTETRICS & GYNECOLOGY

## 2023-06-26 PROCEDURE — 84112 EVAL AMNIOTIC FLUID PROTEIN: CPT | Performed by: OBSTETRICS & GYNECOLOGY

## 2023-06-26 PROCEDURE — 258N000003 HC RX IP 258 OP 636: Performed by: OBSTETRICS & GYNECOLOGY

## 2023-06-26 PROCEDURE — 120N000001 HC R&B MED SURG/OB

## 2023-06-26 PROCEDURE — 370N000003 HC ANESTHESIA WARD SERVICE: Performed by: ANESTHESIOLOGY

## 2023-06-26 PROCEDURE — 722N000001 HC LABOR CARE VAGINAL DELIVERY SINGLE

## 2023-06-26 PROCEDURE — 3E0R3BZ INTRODUCTION OF ANESTHETIC AGENT INTO SPINAL CANAL, PERCUTANEOUS APPROACH: ICD-10-PCS | Performed by: ANESTHESIOLOGY

## 2023-06-26 PROCEDURE — 250N000011 HC RX IP 250 OP 636

## 2023-06-26 PROCEDURE — 86850 RBC ANTIBODY SCREEN: CPT | Performed by: OBSTETRICS & GYNECOLOGY

## 2023-06-26 PROCEDURE — 00HU33Z INSERTION OF INFUSION DEVICE INTO SPINAL CANAL, PERCUTANEOUS APPROACH: ICD-10-PCS | Performed by: ANESTHESIOLOGY

## 2023-06-26 PROCEDURE — 0HQ9XZZ REPAIR PERINEUM SKIN, EXTERNAL APPROACH: ICD-10-PCS | Performed by: OBSTETRICS & GYNECOLOGY

## 2023-06-26 RX ORDER — CITRIC ACID/SODIUM CITRATE 334-500MG
30 SOLUTION, ORAL ORAL
Status: DISCONTINUED | OUTPATIENT
Start: 2023-06-26 | End: 2023-06-26 | Stop reason: HOSPADM

## 2023-06-26 RX ORDER — MISOPROSTOL 200 UG/1
400 TABLET ORAL
Status: DISCONTINUED | OUTPATIENT
Start: 2023-06-26 | End: 2023-06-28 | Stop reason: HOSPADM

## 2023-06-26 RX ORDER — KETOROLAC TROMETHAMINE 30 MG/ML
30 INJECTION, SOLUTION INTRAMUSCULAR; INTRAVENOUS
Status: DISCONTINUED | OUTPATIENT
Start: 2023-06-26 | End: 2023-06-26

## 2023-06-26 RX ORDER — MISOPROSTOL 200 UG/1
400 TABLET ORAL
Status: DISCONTINUED | OUTPATIENT
Start: 2023-06-26 | End: 2023-06-26 | Stop reason: HOSPADM

## 2023-06-26 RX ORDER — METHYLERGONOVINE MALEATE 0.2 MG/ML
200 INJECTION INTRAVENOUS
Status: DISCONTINUED | OUTPATIENT
Start: 2023-06-26 | End: 2023-06-26 | Stop reason: HOSPADM

## 2023-06-26 RX ORDER — FENTANYL CITRATE 50 UG/ML
50 INJECTION, SOLUTION INTRAMUSCULAR; INTRAVENOUS EVERY 30 MIN PRN
Status: DISCONTINUED | OUTPATIENT
Start: 2023-06-26 | End: 2023-06-26 | Stop reason: HOSPADM

## 2023-06-26 RX ORDER — ONDANSETRON 2 MG/ML
4 INJECTION INTRAMUSCULAR; INTRAVENOUS EVERY 6 HOURS PRN
Status: DISCONTINUED | OUTPATIENT
Start: 2023-06-26 | End: 2023-06-26

## 2023-06-26 RX ORDER — BISACODYL 10 MG
10 SUPPOSITORY, RECTAL RECTAL DAILY PRN
Status: DISCONTINUED | OUTPATIENT
Start: 2023-06-26 | End: 2023-06-28 | Stop reason: HOSPADM

## 2023-06-26 RX ORDER — LIDOCAINE 40 MG/G
CREAM TOPICAL
Status: DISCONTINUED | OUTPATIENT
Start: 2023-06-26 | End: 2023-06-26 | Stop reason: HOSPADM

## 2023-06-26 RX ORDER — METHYLERGONOVINE MALEATE 0.2 MG/ML
200 INJECTION INTRAVENOUS
Status: DISCONTINUED | OUTPATIENT
Start: 2023-06-26 | End: 2023-06-28 | Stop reason: HOSPADM

## 2023-06-26 RX ORDER — OXYTOCIN 10 [USP'U]/ML
10 INJECTION, SOLUTION INTRAMUSCULAR; INTRAVENOUS
Status: DISCONTINUED | OUTPATIENT
Start: 2023-06-26 | End: 2023-06-28 | Stop reason: HOSPADM

## 2023-06-26 RX ORDER — OXYTOCIN/0.9 % SODIUM CHLORIDE 30/500 ML
340 PLASTIC BAG, INJECTION (ML) INTRAVENOUS CONTINUOUS PRN
Status: DISCONTINUED | OUTPATIENT
Start: 2023-06-26 | End: 2023-06-26 | Stop reason: HOSPADM

## 2023-06-26 RX ORDER — MODIFIED LANOLIN
OINTMENT (GRAM) TOPICAL
Status: DISCONTINUED | OUTPATIENT
Start: 2023-06-26 | End: 2023-06-28 | Stop reason: HOSPADM

## 2023-06-26 RX ORDER — DOCUSATE SODIUM 100 MG/1
100 CAPSULE, LIQUID FILLED ORAL DAILY
Status: DISCONTINUED | OUTPATIENT
Start: 2023-06-26 | End: 2023-06-28 | Stop reason: HOSPADM

## 2023-06-26 RX ORDER — ACETAMINOPHEN 325 MG/1
650 TABLET ORAL EVERY 4 HOURS PRN
Status: DISCONTINUED | OUTPATIENT
Start: 2023-06-26 | End: 2023-06-26 | Stop reason: HOSPADM

## 2023-06-26 RX ORDER — TRANEXAMIC ACID 10 MG/ML
1 INJECTION, SOLUTION INTRAVENOUS EVERY 30 MIN PRN
Status: DISCONTINUED | OUTPATIENT
Start: 2023-06-26 | End: 2023-06-28 | Stop reason: HOSPADM

## 2023-06-26 RX ORDER — IBUPROFEN 800 MG/1
800 TABLET, FILM COATED ORAL
Status: DISCONTINUED | OUTPATIENT
Start: 2023-06-26 | End: 2023-06-26

## 2023-06-26 RX ORDER — NALOXONE HYDROCHLORIDE 0.4 MG/ML
0.2 INJECTION, SOLUTION INTRAMUSCULAR; INTRAVENOUS; SUBCUTANEOUS
Status: DISCONTINUED | OUTPATIENT
Start: 2023-06-26 | End: 2023-06-26 | Stop reason: HOSPADM

## 2023-06-26 RX ORDER — SODIUM CHLORIDE, SODIUM LACTATE, POTASSIUM CHLORIDE, CALCIUM CHLORIDE 600; 310; 30; 20 MG/100ML; MG/100ML; MG/100ML; MG/100ML
INJECTION, SOLUTION INTRAVENOUS CONTINUOUS
Status: DISCONTINUED | OUTPATIENT
Start: 2023-06-26 | End: 2023-06-26 | Stop reason: HOSPADM

## 2023-06-26 RX ORDER — NALOXONE HYDROCHLORIDE 0.4 MG/ML
0.4 INJECTION, SOLUTION INTRAMUSCULAR; INTRAVENOUS; SUBCUTANEOUS
Status: DISCONTINUED | OUTPATIENT
Start: 2023-06-26 | End: 2023-06-26 | Stop reason: HOSPADM

## 2023-06-26 RX ORDER — HYDROXYZINE HYDROCHLORIDE 50 MG/1
100 TABLET, FILM COATED ORAL
Status: COMPLETED | OUTPATIENT
Start: 2023-06-26 | End: 2023-06-26

## 2023-06-26 RX ORDER — CARBOPROST TROMETHAMINE 250 UG/ML
250 INJECTION, SOLUTION INTRAMUSCULAR
Status: DISCONTINUED | OUTPATIENT
Start: 2023-06-26 | End: 2023-06-26 | Stop reason: HOSPADM

## 2023-06-26 RX ORDER — FENTANYL CITRATE 50 UG/ML
100 INJECTION, SOLUTION INTRAMUSCULAR; INTRAVENOUS ONCE
Status: COMPLETED | OUTPATIENT
Start: 2023-06-26 | End: 2023-06-26

## 2023-06-26 RX ORDER — CARBOPROST TROMETHAMINE 250 UG/ML
250 INJECTION, SOLUTION INTRAMUSCULAR
Status: DISCONTINUED | OUTPATIENT
Start: 2023-06-26 | End: 2023-06-28 | Stop reason: HOSPADM

## 2023-06-26 RX ORDER — OXYTOCIN/0.9 % SODIUM CHLORIDE 30/500 ML
100-340 PLASTIC BAG, INJECTION (ML) INTRAVENOUS CONTINUOUS PRN
Status: DISCONTINUED | OUTPATIENT
Start: 2023-06-26 | End: 2023-06-28 | Stop reason: HOSPADM

## 2023-06-26 RX ORDER — MISOPROSTOL 200 UG/1
800 TABLET ORAL
Status: DISCONTINUED | OUTPATIENT
Start: 2023-06-26 | End: 2023-06-26 | Stop reason: HOSPADM

## 2023-06-26 RX ORDER — PROCHLORPERAZINE MALEATE 10 MG
10 TABLET ORAL EVERY 6 HOURS PRN
Status: DISCONTINUED | OUTPATIENT
Start: 2023-06-26 | End: 2023-06-26 | Stop reason: HOSPADM

## 2023-06-26 RX ORDER — MISOPROSTOL 200 UG/1
800 TABLET ORAL
Status: DISCONTINUED | OUTPATIENT
Start: 2023-06-26 | End: 2023-06-28 | Stop reason: HOSPADM

## 2023-06-26 RX ORDER — TRANEXAMIC ACID 10 MG/ML
1 INJECTION, SOLUTION INTRAVENOUS EVERY 30 MIN PRN
Status: DISCONTINUED | OUTPATIENT
Start: 2023-06-26 | End: 2023-06-26 | Stop reason: HOSPADM

## 2023-06-26 RX ORDER — OXYTOCIN 10 [USP'U]/ML
10 INJECTION, SOLUTION INTRAMUSCULAR; INTRAVENOUS
Status: DISCONTINUED | OUTPATIENT
Start: 2023-06-26 | End: 2023-06-26 | Stop reason: HOSPADM

## 2023-06-26 RX ORDER — FENTANYL/BUPIVACAINE/NS/PF 2-1250MCG
PLASTIC BAG, INJECTION (ML) INJECTION
Status: COMPLETED
Start: 2023-06-26 | End: 2023-06-26

## 2023-06-26 RX ORDER — EPHEDRINE SULFATE 50 MG/ML
5 INJECTION, SOLUTION INTRAMUSCULAR; INTRAVENOUS; SUBCUTANEOUS
Status: DISCONTINUED | OUTPATIENT
Start: 2023-06-26 | End: 2023-06-26 | Stop reason: HOSPADM

## 2023-06-26 RX ORDER — OXYTOCIN/0.9 % SODIUM CHLORIDE 30/500 ML
340 PLASTIC BAG, INJECTION (ML) INTRAVENOUS CONTINUOUS PRN
Status: DISCONTINUED | OUTPATIENT
Start: 2023-06-26 | End: 2023-06-28 | Stop reason: HOSPADM

## 2023-06-26 RX ORDER — ONDANSETRON 4 MG/1
4 TABLET, ORALLY DISINTEGRATING ORAL EVERY 6 HOURS PRN
Status: DISCONTINUED | OUTPATIENT
Start: 2023-06-26 | End: 2023-06-26 | Stop reason: HOSPADM

## 2023-06-26 RX ORDER — METOCLOPRAMIDE HYDROCHLORIDE 5 MG/ML
10 INJECTION INTRAMUSCULAR; INTRAVENOUS EVERY 6 HOURS PRN
Status: DISCONTINUED | OUTPATIENT
Start: 2023-06-26 | End: 2023-06-26 | Stop reason: HOSPADM

## 2023-06-26 RX ORDER — IBUPROFEN 800 MG/1
800 TABLET, FILM COATED ORAL EVERY 6 HOURS PRN
Status: DISCONTINUED | OUTPATIENT
Start: 2023-06-26 | End: 2023-06-28 | Stop reason: HOSPADM

## 2023-06-26 RX ORDER — ONDANSETRON 2 MG/ML
4 INJECTION INTRAMUSCULAR; INTRAVENOUS EVERY 6 HOURS PRN
Status: DISCONTINUED | OUTPATIENT
Start: 2023-06-26 | End: 2023-06-26 | Stop reason: HOSPADM

## 2023-06-26 RX ORDER — METOCLOPRAMIDE 10 MG/1
10 TABLET ORAL EVERY 6 HOURS PRN
Status: DISCONTINUED | OUTPATIENT
Start: 2023-06-26 | End: 2023-06-26 | Stop reason: HOSPADM

## 2023-06-26 RX ORDER — HYDROCORTISONE 25 MG/G
CREAM TOPICAL 3 TIMES DAILY PRN
Status: DISCONTINUED | OUTPATIENT
Start: 2023-06-26 | End: 2023-06-28 | Stop reason: HOSPADM

## 2023-06-26 RX ORDER — NALBUPHINE HYDROCHLORIDE 20 MG/ML
2.5-5 INJECTION, SOLUTION INTRAMUSCULAR; INTRAVENOUS; SUBCUTANEOUS EVERY 6 HOURS PRN
Status: ACTIVE | OUTPATIENT
Start: 2023-06-26 | End: 2023-06-28

## 2023-06-26 RX ORDER — ACETAMINOPHEN 325 MG/1
650 TABLET ORAL EVERY 4 HOURS PRN
Status: DISCONTINUED | OUTPATIENT
Start: 2023-06-26 | End: 2023-06-28 | Stop reason: HOSPADM

## 2023-06-26 RX ORDER — PROCHLORPERAZINE 25 MG
25 SUPPOSITORY, RECTAL RECTAL EVERY 12 HOURS PRN
Status: DISCONTINUED | OUTPATIENT
Start: 2023-06-26 | End: 2023-06-26 | Stop reason: HOSPADM

## 2023-06-26 RX ORDER — FENTANYL CITRATE 50 UG/ML
INJECTION, SOLUTION INTRAMUSCULAR; INTRAVENOUS
Status: COMPLETED | OUTPATIENT
Start: 2023-06-26 | End: 2023-06-26

## 2023-06-26 RX ORDER — ONDANSETRON 4 MG/1
4 TABLET, ORALLY DISINTEGRATING ORAL EVERY 6 HOURS PRN
Status: DISCONTINUED | OUTPATIENT
Start: 2023-06-26 | End: 2023-06-26

## 2023-06-26 RX ADMIN — SODIUM CHLORIDE, POTASSIUM CHLORIDE, SODIUM LACTATE AND CALCIUM CHLORIDE: 600; 310; 30; 20 INJECTION, SOLUTION INTRAVENOUS at 06:38

## 2023-06-26 RX ADMIN — IBUPROFEN 800 MG: 800 TABLET, FILM COATED ORAL at 19:52

## 2023-06-26 RX ADMIN — HYDROXYZINE HYDROCHLORIDE 100 MG: 50 TABLET, FILM COATED ORAL at 02:58

## 2023-06-26 RX ADMIN — ACETAMINOPHEN 650 MG: 325 TABLET, FILM COATED ORAL at 19:52

## 2023-06-26 RX ADMIN — SODIUM CHLORIDE, POTASSIUM CHLORIDE, SODIUM LACTATE AND CALCIUM CHLORIDE 1000 ML: 600; 310; 30; 20 INJECTION, SOLUTION INTRAVENOUS at 05:27

## 2023-06-26 RX ADMIN — Medication: at 06:33

## 2023-06-26 RX ADMIN — ACETAMINOPHEN 650 MG: 325 TABLET, FILM COATED ORAL at 15:16

## 2023-06-26 RX ADMIN — Medication 340 ML/HR: at 12:44

## 2023-06-26 RX ADMIN — IBUPROFEN 800 MG: 800 TABLET, FILM COATED ORAL at 13:52

## 2023-06-26 RX ADMIN — DOCUSATE SODIUM 100 MG: 100 CAPSULE, LIQUID FILLED ORAL at 15:16

## 2023-06-26 RX ADMIN — FENTANYL CITRATE 100 MCG: 50 INJECTION, SOLUTION INTRAMUSCULAR; INTRAVENOUS at 06:29

## 2023-06-26 RX ADMIN — BENZOCAINE: 11.4 AEROSOL, SPRAY TOPICAL at 18:49

## 2023-06-26 ASSESSMENT — ACTIVITIES OF DAILY LIVING (ADL)
ADLS_ACUITY_SCORE: 18
ADLS_ACUITY_SCORE: 31
WALKING_OR_CLIMBING_STAIRS_DIFFICULTY: NO
CONCENTRATING,_REMEMBERING_OR_MAKING_DECISIONS_DIFFICULTY: NO
ADLS_ACUITY_SCORE: 18
DIFFICULTY_EATING/SWALLOWING: NO
ADLS_ACUITY_SCORE: 31
FALL_HISTORY_WITHIN_LAST_SIX_MONTHS: NO
ADLS_ACUITY_SCORE: 18
ADLS_ACUITY_SCORE: 18
DRESSING/BATHING_DIFFICULTY: NO
DOING_ERRANDS_INDEPENDENTLY_DIFFICULTY: NO
TOILETING_ISSUES: NO
ADLS_ACUITY_SCORE: 18
WEAR_GLASSES_OR_BLIND: NO
ADLS_ACUITY_SCORE: 31
CHANGE_IN_FUNCTIONAL_STATUS_SINCE_ONSET_OF_CURRENT_ILLNESS/INJURY: NO

## 2023-06-26 NOTE — PROVIDER NOTIFICATION
"   06/26/23 0232   Provider Notification   Provider Name/Title Dr. Lynch   Method of Notification Phone   Request Evaluate - Remote   Notification Reason Status Update    Notified MD of all negative lab results, repeat SVE essentially unchanged, pt jonel every 1-3 min by toco, states she is quite uncomfortable, would consider epidural now if was admitted, states \"I don't think I could  do this at home\", discussed morphine/vistaril, pt states morphine has made her nauseous in the past so would not be interested in that. FHT category 1, no decelerations since variable x1.    Per MD, offer 100 mg po vistaril, pt can come off monitor, encourage movement, recheck SVE in 1-2 hours.   "

## 2023-06-26 NOTE — PROVIDER NOTIFICATION
23 0135   Provider Notification   Provider Name/Title Dr. Lynch   Method of Notification Phone   Request Evaluate - Remote   Notification Reason Status Update      , 40.5 weeks, GBS negative, pregnancy complicated by marginal cord insertion, here for rule out rupture and rule out labor. Fern and ROM+ pending, pooling was negative, pt feels like she has continued leaking especially with contractions, did send wet prep as well for chunky discharge, pending. SVE /-1, was 3.5 cm in clinic on Thursday per pt. Ctx every 3-4 min by toco, is uncomfortable, breathing through. FHT overall category 1 with accelerations, did have 1 significant variable deceleration with short period of minimal variability following.     Per MD, notify with lab results, recheck SVE before calling.

## 2023-06-26 NOTE — PROVIDER NOTIFICATION
23 1025   Provider Notification   Provider Name/Title Dr Kaplan   Method of Notification Electronic Page   Request Attend Delivery   Notification Reason SVE     Patient is complete, +2 station. Patient feeling lots of pressure, no  noted with contractions. Category 1 tracing, moderate variability, accels present, no decels.      MD will be here ASAP, hold off on pushing for now.

## 2023-06-26 NOTE — PROVIDER NOTIFICATION
06/26/23 0000   Provider Notification   Provider Name/Title Dr. Lynch   Method of Notification Phone   Request Evaluate - Remote   Notification Reason Patient Arrived       Notified MD of pt in triage for rule out labor and rupture, requesting orders for r/o SROM    Per MD, obtain fern and ROM+ if no obvious ROM

## 2023-06-26 NOTE — ANESTHESIA PREPROCEDURE EVALUATION
Anesthesia Pre-Procedure Evaluation    Patient: Bindu Mcleod   MRN: [id number] : 1990        Procedure : * No procedures listed *          Past Medical History:   Diagnosis Date     Anxiety      Depression      Heart murmur      Ovarian cyst rupture       Past Surgical History:   Procedure Laterality Date     EYE SURGERY       tonsilectomy        Allergies   Allergen Reactions     Azithromycin       Social History     Tobacco Use     Smoking status: Former     Smokeless tobacco: Not on file   Substance Use Topics     Alcohol use: Yes      Wt Readings from Last 1 Encounters:   23 67.1 kg (148 lb)        Anesthesia Evaluation   Pt has had prior anesthetic.     No history of anesthetic complications       ROS/MED HX  ENT/Pulmonary:  - neg pulmonary ROS     Neurologic:  - neg neurologic ROS     Cardiovascular:  - neg cardiovascular ROS     METS/Exercise Tolerance:     Hematologic:       Musculoskeletal:       GI/Hepatic:  - neg GI/hepatic ROS     Renal/Genitourinary:       Endo:  - neg endo ROS     Psychiatric/Substance Use:  - neg psychiatric ROS     Infectious Disease:       Malignancy:       Other:            Physical Exam    Airway         TM distance: > 3 FB   Neck ROM: full   Mouth opening: > 3 cm    Respiratory Devices and Support         Dental           Cardiovascular             Pulmonary                   OUTSIDE LABS:  CBC:   Lab Results   Component Value Date    WBC 7.6 2023    WBC 8.0 2019    HGB 11.9 2023    HGB 12.2 2023    HCT 37.7 2023    HCT 44.5 2019     2023     2019     BMP:   Lab Results   Component Value Date     2023     2019    POTASSIUM 4.4 2023    POTASSIUM 3.6 2019    CHLORIDE 103 2023    CHLORIDE 105 2019    CO2 26 2023    CO2 29 2019    BUN 9.5 2023    BUN 11 2019    CR 0.63 2023    CR 0.81 2019    GLC 98 2023    GLC  120 (H) 11/14/2019     COAGS: No results found for: PTT, INR, FIBR  POC:   Lab Results   Component Value Date     (H) 11/01/2012    HCG Negative 03/25/2016    HCGS Negative 11/14/2019     HEPATIC:   Lab Results   Component Value Date    ALBUMIN 3.6 02/20/2023    PROTTOTAL 6.5 02/20/2023    ALT 18 02/20/2023    AST 14 02/20/2023    ALKPHOS 50 02/20/2023    BILITOTAL <0.2 02/20/2023     OTHER:   Lab Results   Component Value Date    LACT 1.8 11/14/2019    WISAM 8.9 02/20/2023    LIPASE 80 07/14/2012    CRP <5.0 07/15/2012       Anesthesia Plan    ASA Status:  2      Anesthesia Type: Epidural.              Consents    Anesthesia Plan(s) and associated risks, benefits, and realistic alternatives discussed. Questions answered and patient/representative(s) expressed understanding.    - Discussed:     - Discussed with:  Patient         Postoperative Care            Comments:           neg OB ROS.       Miky Muir MD

## 2023-06-26 NOTE — PROVIDER NOTIFICATION
06/26/23 0800   Provider Notification   Provider Name/Title Dr Kaplan   Method of Notification In Department   Notification Reason Status Update     Epidural in place. SVE 6/90/-1. Intact. Ctx every 5 minutes. Category 1 tracing, potential sleep cycle for the last 15 minutes. Uncomplicated pregnancy.     Order to recheck SVE at 0950 and update MD.

## 2023-06-26 NOTE — PROVIDER NOTIFICATION
06/26/23 0420   Provider Notification   Provider Name/Title Dr. Lynch   Method of Notification Phone   Request Evaluate - Remote   Notification Reason Status Update       SVE still unchanged, ctx pattern still every 1-4 min, pt still very uncomfortable with contractions, very hesitant to go home, FHT category 1    TORB intrapartum admission orders, pt can have epidural if she desires, will augment labor if neccessary, call if ctx space out.

## 2023-06-26 NOTE — ANESTHESIA PROCEDURE NOTES
"Epidural catheter Procedure Note    Pre-Procedure   Staff -        Anesthesiologist:  Miky uMir MD       Performed By: anesthesiologist       Location: OB       Procedure Start/Stop Times: 6/26/2023 6:18 AM and 6/26/2023 6:38 AM       Pre-Anesthestic Checklist: patient identified, IV checked, risks and benefits discussed, informed consent, monitors and equipment checked, pre-op evaluation and at physician/surgeon's request  Timeout:       Correct Patient: Yes        Correct Procedure: Yes        Correct Site: Yes        Correct Position: Yes   Procedure Documentation  Procedure: epidural catheter       Patient Position: sitting       Patient Prep/Sterile Barriers: sterile gloves, mask, patient draped       Skin prep: Betadine       Local skin infiltrated with 3 mL of 1% lidocaine.        Insertion Site: L3-4. (midline approach).       Technique: LORT saline        EREN at 5 cm.       Needle Type: MENABANQERy needle       Needle Gauge: 17.        Needle Length (Inches): 3.5        Catheter: 19 G.          Catheter threaded easily.             # of attempts: 1 and  # of redirects:  1    Assessment/Narrative         Paresthesias: No.       Test dose of 3 mL at 06:29 CDT.        .Test dose names: 2% lidocaine wit 1:200,000 epinephrine.       Insertion/Infusion Method: LORT saline       Aspiration negative for Heme or CSF via Epidural Catheter.    Medication(s) Administered   0.125% Bupivacaine + 2 mcg/mL Fentanyl via CADD (Epidural) - EPIDURAL   10 mL - 6/26/2023 6:29:00 AM  Fentanyl PF (Epidural) - EPIDURAL   100 mcg - 6/26/2023 6:29:00 AM  Medication Administration Time: 6/26/2023 6:18 AM     Comments:  [id number], lot [id number], exp. 2024-05-31      FOR [facility] ([facility]) ONLY:   Pain Team Contact information: please page the Pain Team Via [employer]. Search \"Pain\". During daytime hours, please page the attending first. At night please page the resident first.      "

## 2023-06-26 NOTE — DISCHARGE SUMMARY
[facility]    Discharge Summary  Obstetrics    Date of Admission:  2023  Date of Discharge:  2023  Discharging Provider: Danitza Doe MD      Discharge Diagnoses   -  at 40 wks GA  - Precipitious delivery    History of Present Illness   Bindu Mcleod is a 32 year old female now  who presented to L&D @ 40w5d in SOL. Her pregnancy has been complicated by marginal cord insertion (3rd TriM Kaleb not c/w FGR), Maternal idiopathic pulmonary artery dilation (fetal Echo wnl; MFM consult 12/15/22: stable since age 21; last cards visit 10yrs; no Hx connective tissue disorder; Cards consult made and declined by provider so long as pt a-stx), FOB prior child bicuspid aortic valve, Exercised-induced bronchospasm, GERD, Round ligament pain, Low back pain w/o sciatica, Hx Dep. Please see her admit H&P for full details of her PMH, PSH, Meds, Allergies and exam on admit.    Hospital Course   The patient had a Normal spontaneous vaginal delivery @ 40w5d, please see her delivery summary for full details. Baby girl. QBL 145mL. Delivered after approximately 30minutes of pushing with epidural. 1st degree and right periurethral repaired.     Her postpartum course was uncomplicated. On postpartum day 2, she was meeting all of her postpartum goals and deemed stable for discharge. She was voiding without difficulty, tolerating a regular diet without nausea and vomiting, her pain was well controlled on oral pain medicines and her lochia was appropriate.    Hgb:   Lab Results   Component Value Date    HGB 11.9 2023    HGB 12.2 2023    HGB 15.1 2019    HGB 14.7 2016       No results found for: RH pos and rhogam was not given    Contraception was discussed and will be addressed at her postpartum appointment.    Instructions:  1) Call for temperature greater than 100.4F, foul smelling vaginal discharge, bleeding more than 1 pad per hour for 2 hrs, pain not controlled  by oral pain meds, severe constipation or severe nausea or vomiting.  2) She was instructed to follow-up with her primary OB in 6 weeks for a routine postpartum visit  3) She was instructed to continue her PNV on discharge if she wished to breast feed her infant.    Discharge Disposition   Discharged to home   Condition at discharge: Stable    Primary Care Physician   Cara BROCK    Consultations This Hospital Stay   ANESTHESIOLOGY IP CONSULT  LACTATION IP CONSULT    Discharge Orders      Breast pump - Manual/Electric    Breast Pump Documentation:  Manual/Electric Pump: To support adequate breast milk production and nutrition for infant.     I, the undersigned, certify that the above prescribed supplies are medically necessary for this patient and is both reasonable and necessary in reference to accepted standards of medical and necessary in reference to accepted standards of medical practice in the treatment of this patient's condition and is not prescribed as a convenience.     Discharge Medications   Current Discharge Medication List      CONTINUE these medications which have NOT CHANGED    Details   aspirin (ASA) 81 MG chewable tablet Take 81 mg by mouth daily      ondansetron (ZOFRAN) 4 MG tablet Take 1 tablet (4 mg) by mouth every 8 hours as needed for nausea  Qty: 10 tablet, Refills: 0      Prenatal MV-Min-Fe Fum-FA-DHA (PRENATAL 1 PO)            Allergies   Allergies   Allergen Reactions     Azithromycin

## 2023-06-26 NOTE — L&D DELIVERY NOTE
VAGINAL DELIVERY NOTE  Patient: Bindu Mcleod  : 1990  MRN: [id number]  Date: 23  Time: 12:39PM    Delivering provider: Everett Kaplan MD  QBL: 145mL      Patient presented with SOL. She is a 31 y/o G1 at 40w5d.   Her pregnancy was complicated by marginal cord insertion (3rd TriM Kaleb not c/w FGR), Maternal idiopathic pulmonary artery dilation (fetal Echo wnl; MFM consult 12/15/22: stable since age 21; last cards visit 10yrs; no Hx connective tissue disorder; Cards consult made and declined by provider so long as pt a-stx), FOB prior child bicuspid aortic valve, Exercised-induced bronchospasm, GERD, Round ligament pain, Low back pain w/o sciatica, Hx Dep.  During her admission she had epidural, SROM and made quick change through active labor. Once complete she pushed for approximately 30 minutes with excellent effort and control.    She delivered a female .  She pushed effectively and head delivered in J maneuver, followed by anterior and posterior shoulder and the rest of the body followed atraumatically.  The baby transitioned well and was placed on mother, skin-to-skin.   Delayed cord clamp, cut by FOB, with 3VC.  Placenta delivered with active management intact.  Uterus massaged and IV pitocin running, firm and at umbilicus.  Perineum inspected and 1st degree repaired with figure of 8 suture of 3-0 vicryl. Right periurethral reapproximated with x3 simple interrupted of 3-0 monocryl and left periurethral was hemostatic and well approximated w/o stitch.   Upon leaving room, good hemostasis with mom and  in stable condition.  Sponge and needle counts were correct x2.

## 2023-06-26 NOTE — PROVIDER NOTIFICATION
06/26/23 0955   Provider Notification   Provider Name/Title Dr Kaplan   Method of Notification Electronic Page   Request Evaluate - Remote   Notification Reason SVE     SVE 9//-1. RN did not feel bag of water this time, but only scant fluid noted on the pad. Contractions every 4-6 mins.     No new orders, continue monitoring, MD will check back for status update in about an hour.

## 2023-06-26 NOTE — H&P
[facility]     History and Physical  Obstetrics and Gynecology     Date of Admission:  2023    History of Present Illness   Bindu Mcleod is a 32 year old female  40w5d with Estimated Date of Delivery: 2023 who presents with SOL. Denied VB, LOF, decr FM on admission. Now s/p epidural, pain controlled. Denies any CP, SOB, palpitations.    PRENATAL COURSE  Prenatal care was received at [facility] and the  course was complicated by: marginal cord insertion (3rd TriM Kaleb not c/w FGR), Maternal idiopathic pulmonary artery dilation (fetal Echo wnl; MFM consult 12/15/22: stable since age 21; last cards visit 10yrs; no Hx connective tissue disorder; Cards consult made and declined by provider so long as pt a-stx), FOB prior child bicuspid aortic valve, Exercised-induced bronchospasm, GERD, Round ligament pain, Low back pain w/o sciatica, Hx Dep.    Prenatal Care:  - OB labs reviewed: A, Rubella immune, Heb B Ag non-reactive, Hep C NR, HIV negative, Trep negative  - Genetics: NIPS nl  - Anatomy ultrasound: level 2 US normal with marginal cord insertion  - Rh positive, Rhogam not indicated  - GCT 94 at 28 weeks along with Hgb 11.6, Trep NR   - flu declined, Tdap 23  - declined Covid vaccine   - GBS at 36 weeks: neg  - Feed: breast, has pump  - Contraception: unsure     Recent Labs   Lab Test 23  0510   AS Negative     Rhogam not indicated   No lab results found.    Past Medical History    I have reviewed this patient's medical history and updated it with pertinent information if needed.   Past Medical History:   Diagnosis Date     Anxiety      Depression      Heart murmur      Ovarian cyst rupture        Past Surgical History   I have reviewed this patient's surgical history and updated it with pertinent information if needed.  Past Surgical History:   Procedure Laterality Date     EYE SURGERY       tonsilectomy         Prior to  Admission Medications   Prior to Admission Medications   Prescriptions Last Dose Informant Patient Reported? Taking?   Prenatal MV-Min-Fe Fum-FA-DHA (PRENATAL 1 PO)   Yes No   aspirin (ASA) 81 MG chewable tablet   Yes No   Sig: Take 81 mg by mouth daily   ondansetron (ZOFRAN) 4 MG tablet   No No   Sig: Take 1 tablet (4 mg) by mouth every 8 hours as needed for nausea      Facility-Administered Medications: None     Allergies   Allergies   Allergen Reactions     Azithromycin        Social History   I have reviewed this patient's social history and updated it with pertinent information if needed. Bindu Mcleod  reports that she has quit smoking. She does not have any smokeless tobacco history on file. She reports current alcohol use. She reports that she does not use drugs.    Family History   I have reviewed this patient's family history and updated it with pertinent information if needed.   No family history on file.    Immunization History   As above.    Physical Exam   Temp: 97.8  F (36.6  C) Temp src: Oral BP: 101/55     Resp: 16 SpO2: 100 %      Vital Signs with Ranges  Temp:  [97.8  F (36.6  C)] 97.8  F (36.6  C)  Resp:  [16] 16  BP: ()/(50-69) 101/55  SpO2:  [94 %-100 %] 100 %  Fetal Heart Tones: 125 baseline, moderate variablility, + accels, no decels and Category I  TOCO: frequency q 4-5 minutes    Constitutional: healthy, alert, and active   Respiratory: non-labored  Cardiovascular: RR  Abdomen: gravid, single vertex fetus, non-tender, EFW 8 lbs   Cervical Exam: /-1 s/p epidural per RN  Skin/Extremites: normal skin color, texture, turgor  Neurologic: A&O  Neuropsychiatric: General: normal, calm, and normal eye contact    23 BSUS: Vertex.    Assessment & Plan   Bindu Mcleod is a 32 year old female  who presents at 40w5d with SOL.   GBS neg.   NST reactive.  Category  I.     Admit - see IP orders  - Labs, Hgb 11.9  - s/p Epidural  - anticipate     Everett Kaplan MD

## 2023-06-26 NOTE — PROVIDER NOTIFICATION
06/26/23 1138   Provider Notification   Provider Name/Title Dr. Kaplan   Method of Notification In Department   Request Evaluate - Remote   Notification Reason Status Update     Dr. Kaplan at nurse station and updated on SROM with clear fluid noted, patient feeling increased rectal pressure with contractions. Orders received to begin pushing efforts at this time. MD on unit and available.

## 2023-06-27 PROCEDURE — 250N000013 HC RX MED GY IP 250 OP 250 PS 637: Performed by: OBSTETRICS & GYNECOLOGY

## 2023-06-27 PROCEDURE — 999N000081 HC STATISTIC IP LACTATION SERVICES 31-45 MIN

## 2023-06-27 PROCEDURE — 120N000001 HC R&B MED SURG/OB

## 2023-06-27 RX ADMIN — ACETAMINOPHEN 650 MG: 325 TABLET, FILM COATED ORAL at 09:05

## 2023-06-27 RX ADMIN — IBUPROFEN 800 MG: 800 TABLET, FILM COATED ORAL at 14:02

## 2023-06-27 RX ADMIN — ACETAMINOPHEN 650 MG: 325 TABLET, FILM COATED ORAL at 17:03

## 2023-06-27 RX ADMIN — ACETAMINOPHEN 650 MG: 325 TABLET, FILM COATED ORAL at 00:32

## 2023-06-27 RX ADMIN — ACETAMINOPHEN 650 MG: 325 TABLET, FILM COATED ORAL at 04:48

## 2023-06-27 RX ADMIN — ACETAMINOPHEN 650 MG: 325 TABLET, FILM COATED ORAL at 20:56

## 2023-06-27 RX ADMIN — IBUPROFEN 800 MG: 800 TABLET, FILM COATED ORAL at 20:04

## 2023-06-27 RX ADMIN — IBUPROFEN 800 MG: 800 TABLET, FILM COATED ORAL at 02:05

## 2023-06-27 RX ADMIN — ACETAMINOPHEN 650 MG: 325 TABLET, FILM COATED ORAL at 13:06

## 2023-06-27 RX ADMIN — IBUPROFEN 800 MG: 800 TABLET, FILM COATED ORAL at 08:28

## 2023-06-27 RX ADMIN — DOCUSATE SODIUM 100 MG: 100 CAPSULE, LIQUID FILLED ORAL at 08:27

## 2023-06-27 ASSESSMENT — ACTIVITIES OF DAILY LIVING (ADL)
ADLS_ACUITY_SCORE: 18

## 2023-06-27 NOTE — PLAN OF CARE
VSS. Post partum checks WDL. Ambulating independently. Denies dizziness. Denies pain w/ ibuprofen and tylenol. Voiding spontaneously. Breastfeeding every 2-3 hours. Latch observed. See lactation note. Initiated pumping. Attentive to  cues, bonding well.

## 2023-06-27 NOTE — PROGRESS NOTES
[facility]  PPD# 1    Pt doing well, states she would like to stay until tomorrow in order to continue to have RN assistance with PP care and since latching has been very difficult. Pt is s/p lactation consult. . Ambulating, voiding, tolerating PO. Decreased lochia. Pain controlled. Coping well with infant.      Vitals:    23 1440 23 1952 23 0041 23 0450   BP: 111/58 108/44 95/56 121/49   BP Location:  Right arm     Patient Position:  Semi-Herrera's     Cuff Size:  Adult Regular     Pulse:  88 81 82   Resp:  16 16 16   Temp:  97.9  F (36.6  C) 98  F (36.7  C) 98.1  F (36.7  C)   TempSrc:  Oral Oral Oral   SpO2:         Abd soft, appropriately tender, nondistended, FFBU, no fundal tenderness  Ext 1+ edema bilat LE, no CT BL    Lab Results   Component Value Date    HGB 11.9 2023    HGB 15.1 2019       A/P 32 year old  at 40w6d s/p  PPD# 1.        -Hemodynamically stable   -Rh pos  -Diet; regular  -pain Tylenol and Motrin  -Bowel ppx- Senna/docusate/simethicone  -Rubella immune  -Tdap given prenatally  -Breast feeding, encouraged. Continue PNV's, proper hydration and caloric intake couseled  -BC: not discussed    Hx of maternal depression  - reports mood stable      -Plan; continue routine post-partum care. anticipate discharge home tomorrow      Dr. Yodit Bueno  [phone]  2023 8:03 AM

## 2023-06-27 NOTE — PLAN OF CARE
Vitals stable. Pain controlled with Tylenol and Ibuprofen with good relief. Independent with self care. Assistance needed with breastfeeding, education provided on need to feed baby every 2-3 hours. Hand expression done x1. Bonding well with baby.

## 2023-06-27 NOTE — PLAN OF CARE
VSS on room air. Fundus/lochia WDL. Voiding appropriately and ambulating independently. Pain adequately controlled with PRN medications. Breastfeeding infant with minimal assistance q2-3hr. S/o at bedside, supportive. Positive bonding and attachment with infant observed.     -Rop needed--form given to patient

## 2023-06-27 NOTE — ANESTHESIA POSTPROCEDURE EVALUATION
Patient: Bindu Mcleod    Procedure: * No procedures listed *       Anesthesia Type:  Epidural    Note:     Postop Pain Control:    PONV:    Neuro/Psych:    Airway/Respiratory:    CV/Hemodynamics:    Other NRE:    DID A NON-ROUTINE EVENT OCCUR?     Event details/Postop Comments:      S/P epidural for labor.   I or my partner was immediately available for management of this patient during epidural analgesia infusion.  VSS.  Doing well. Block resolved.  Neuro at baseline. Denies positional headache. Minimal side effects easily managed w/ PRN meds. No apparent anesthetic complications. No follow-up required.    Sheyla Nice MD             Last vitals:  Vitals:    06/26/23 1952 06/27/23 0041 06/27/23 0450   BP: 108/44 95/56 121/49   Pulse: 88 81 82   Resp: 16 16 16   Temp: 97.9  F (36.6  C) 98  F (36.7  C) 98.1  F (36.7  C)   SpO2:          Electronically Signed By: Sheyla Nice MD  June 27, 2023  7:18 AM

## 2023-06-28 VITALS
WEIGHT: 143.4 LBS | DIASTOLIC BLOOD PRESSURE: 60 MMHG | HEART RATE: 73 BPM | RESPIRATION RATE: 15 BRPM | SYSTOLIC BLOOD PRESSURE: 115 MMHG | TEMPERATURE: 97.4 F | OXYGEN SATURATION: 96 % | BODY MASS INDEX: 25.4 KG/M2

## 2023-06-28 PROCEDURE — 250N000013 HC RX MED GY IP 250 OP 250 PS 637: Performed by: OBSTETRICS & GYNECOLOGY

## 2023-06-28 RX ORDER — IBUPROFEN 800 MG/1
800 TABLET, FILM COATED ORAL EVERY 6 HOURS PRN
Qty: 40 TABLET | Refills: 1 | Status: SHIPPED | OUTPATIENT
Start: 2023-06-28

## 2023-06-28 RX ORDER — HYDROCORTISONE 25 MG/G
CREAM TOPICAL 3 TIMES DAILY PRN
Qty: 30 G | Refills: 0 | Status: SHIPPED | OUTPATIENT
Start: 2023-06-28

## 2023-06-28 RX ORDER — DOCUSATE SODIUM 100 MG/1
100 CAPSULE, LIQUID FILLED ORAL DAILY
Qty: 30 CAPSULE | Refills: 0 | Status: SHIPPED | OUTPATIENT
Start: 2023-06-29

## 2023-06-28 RX ORDER — MODIFIED LANOLIN
OINTMENT (GRAM) TOPICAL
Qty: 7 G | Refills: 3 | Status: SHIPPED | OUTPATIENT
Start: 2023-06-28

## 2023-06-28 RX ADMIN — ACETAMINOPHEN 650 MG: 325 TABLET, FILM COATED ORAL at 04:49

## 2023-06-28 RX ADMIN — ACETAMINOPHEN 650 MG: 325 TABLET, FILM COATED ORAL at 08:59

## 2023-06-28 RX ADMIN — IBUPROFEN 800 MG: 800 TABLET, FILM COATED ORAL at 10:32

## 2023-06-28 RX ADMIN — DOCUSATE SODIUM 100 MG: 100 CAPSULE, LIQUID FILLED ORAL at 08:59

## 2023-06-28 RX ADMIN — IBUPROFEN 800 MG: 800 TABLET, FILM COATED ORAL at 04:14

## 2023-06-28 RX ADMIN — ACETAMINOPHEN 650 MG: 325 TABLET, FILM COATED ORAL at 00:57

## 2023-06-28 ASSESSMENT — ACTIVITIES OF DAILY LIVING (ADL)
ADLS_ACUITY_SCORE: 18

## 2023-06-28 NOTE — PLAN OF CARE
Pt is bonding well with baby girl- Dinesh. Pt is breastfeeding and supplementing with donor milk. Plans to supplement with formula at home. Pt is pumping and has a pump at home. Tolerates eating and drinking.  Fundus is firm, midline and 2 cm below umbilicus. Lochia is scant, rubra and pt denies having any clots. VSS. Voiding and passing gas. Ambulating independently. Tolerating cramping and perineal pain well with, tylenol and ibuprofen.- rating a 0/10.     Discharge paperwork reviewed and questions answered. Personal belongings sent with pt and partner. Id bands reviewed and match baby. Discharge weight done. IV removed.

## 2023-06-28 NOTE — DISCHARGE INSTRUCTIONS
Follow up with your provider in 6 weeks.    Warning Signs after Having a Baby    Keep this paper on your fridge or somewhere else where you can see it.    Call your provider if you have any of these symptoms up to 12 weeks after having your baby.    Thoughts of hurting yourself or your baby  Pain in your chest or trouble breathing  Severe headache not helped by pain medicine  Eyesight concerns (blurry vision, seeing spots or flashes of light, other changes to eyesight)  Fainting, shaking or other signs of a seizure    Call 9-1-1 if you feel that it is an emergency.     The symptoms below can happen to anyone after giving birth. They can be very serious. Call your provider if you have any of these warning signs.    Losing too much blood (hemorrhage)    Call your provider if you soak through a pad in less than an hour or pass blood clots bigger than a golf ball. These may be signs that you are bleeding too much.    Blood clots in the legs or lungs    After you give birth, your body naturally clots its blood to help prevent blood loss. Sometimes this increased clotting can happen in other areas of the body, like the legs or lungs. This can block your blood flow and be very dangerous.     Call your provider if you:  Have a red, swollen spot on the back of your leg that is warm or painful when you touch it.   Are coughing up blood.     Infection    Call your provider if you have any of these symptoms:  Fever of 100.4 F (38 C) or higher.  Pain or redness around your stitches if you had an incision.   Any yellow, white, or green fluid coming from places where you had stitches or surgery.    Mood Problems (postpartum depression)    Many people feel sad or have mood changes after having a baby. But for some people, these mood swings are worse.     Call your provider right away if you feel so anxious or nervous that you can't care for yourself or your baby.    Preeclampsia (high blood pressure)    Even if you didn't have high  blood pressure when you were pregnant, you are at risk for the high blood pressure disease called preeclampsia. This risk can last up to 12 weeks after giving birth.     Call your provider if you have:   Pain on your right side under your rib cage  Sudden swelling in the hands and face    Remember: You know your body. If something doesn't feel right, get medical help.     For informational purposes only. Not to replace the advice of your health care provider. Copyright 2020 [facility]. All rights reserved. Clinically reviewed by Dayan Dnaiels RNC-OB, MSN. [employer] 149154 - Rev 02/23.    Postpartum Vaginal Delivery Instructions    Activity     Ask family and friends for help when you need it.  Do not place anything in your vagina for 6 weeks.  You are not restricted on other activities, but take it easy for a few weeks to allow your body to recover from delivery.  You are able to do any activities you feel up to that point.  No driving until you have stopped taking your pain medications (usually two weeks after delivery).     Call your health care provider if you have any of these symptoms:     Increased pain, swelling, redness, or fluid around your stiches from an episiotomy or perineal tear.  A fever above 100.4 F (38 C) with or without chills when placing a thermometer under your tongue.  You soak a sanitary pad with blood within 1 hour, or you see blood clots larger than a golf ball.  Bleeding that lasts more than 6 weeks.  Vaginal discharge that smells bad.  Severe pain, cramping or tenderness in your lower belly area.  A need to urinate more frequently (use the toilet more often), more urgently (use the toilet very quickly), or it burns when you urinate.  Nausea and vomiting.  Redness, swelling or pain around a vein in your leg.  Problems breastfeeding or a red or painful area on your breast.  Chest pain and cough or are gasping for air.  Problems coping with sadness, anxiety, or depression.   If you have any concerns about hurting yourself or the baby, call your provider immediately.   You have questions or concerns after you return home.     Keep your hands clean:  Always wash your hands before touching your perineal area and stitches.  This helps reduce your risk of infection.  If your hands aren't dirty, you may use an alcohol hand-rub to clean your hands. Keep your nails clean and short.

## 2023-06-28 NOTE — PLAN OF CARE
Data: Vital signs within normal limits. Postpartum checks within normal limits - see flow record. Patient eating and drinking normally. Patient able to empty bladder independently and is up ambulating. No apparent signs of infection. Patient performing self cares and is able to care for infant. Patient is breastfeeding using a nipple shield and supplementing with donor milk.   Action: Patient medicated during the shift for pain and cramping with ibuprofen and tylenol. See MAR. Patient reassessed within 1 hour after each medication and pain was improved - patient stated she was comfortable. Patient education done. See flow record.  Response: Positive attachment behaviors observed with infant. Support person present.   Plan: Anticipate discharge on 6/28.

## 2023-06-28 NOTE — LACTATION NOTE
Lactation in to see patient. First baby for family. Reviewed basic breastfeeding education, supply and demand, importance of feeding every 2-3 hours, and  feeding behaviors first 24 hours and beyond. First feed baby gagging and not interested in consistent sucking at breast. Uncoordinated with donor milk via bottle.     Follow up visit at  baby active at breast with swallows heard with breast compressions. Praise and encouragement given. Knows to call for assistance as needed.

## 2023-06-28 NOTE — PROGRESS NOTES
[facility] Postpartum Note    S:  Bindu Mcleod feels well this morning. Was able to sleep last night. Pain control adequate. Lochia minimal. Voiding. Breast feeding. Mood Good.     O:  Vitals were reviewed  Blood pressure 115/60, pulse 73, temperature 97.4  F (36.3  C), temperature source Oral, resp. rate 15, SpO2 96 %, unknown if currently breastfeeding.      General: healthy, alert and no distress  Abd: soft, appropriately tender, fundus firm  Legs: Non-tender, 0+ pitting edema    No results found for: RH  Rubella: immune    Assessment and Plan:   Postpartum Day #2, status post vaginal delivery, doing well.  -- Discharge home  -- F/U 6 weeks w/ Primary OB  -- Discharge meds: see med rec  -- Contraception: considering    -Hemodynamically stable   -Rh pos  -Diet; regular  -pain Tylenol and Motrin  -Bowel ppx- Senna/docusate/simethicone  -Rubella immune  -Tdap given prenatally  -Breast feeding, encouraged. Continue PNV's, proper hydration and caloric intake couseled     Hx of maternal depression  - reports mood stable    Danitza Doe MD  '

## 2023-06-29 ENCOUNTER — PATIENT OUTREACH (OUTPATIENT)
Dept: CARE COORDINATION | Facility: CLINIC | Age: 33
End: 2023-06-29
Payer: COMMERCIAL

## 2023-06-29 NOTE — PROGRESS NOTES
[facility]: Post-Discharge Note  SITUATION                                                      Admission:    Admission Date: 23   Reason for Admission: 40w5d in SOL  Discharge:   Discharge Date: 23  Discharge Diagnosis:  at 40 wks GA  - Precipitious delivery    BACKGROUND                                                      Per hospital discharge summary and inpatient provider notes:  The patient had a Normal spontaneous vaginal delivery @ 40w5d, please see her delivery summary for full details. Baby girl. QBL 145mL. Delivered after approximately 30minutes of pushing with epidural. 1st degree and right periurethral repaired.      Her postpartum course was uncomplicated. On postpartum day 2, she was meeting all of her postpartum goals and deemed stable for discharge. She was voiding without difficulty, tolerating a regular diet without nausea and vomiting, her pain was well controlled on oral pain medicines and her lochia was appropriate.    ASSESSMENT        Discharge Assessment  How are you doing now that you are home?: Great.  In very good spirits.  Says her baby is a very good baby.  How are your symptoms? (Red Flag symptoms escalate to triage hotline per guidelines): New (No longer pregnant.)  Do you feel your condition is stable enough to be safe at home until your provider visit?: Yes  Does the patient have their discharge instructions? : Yes  Does the patient have questions regarding their discharge instructions? : No  Were you started on any new medications or were there changes to any of your previous medications? : No  Does the patient have all of their medications?: Yes  Do you have questions regarding any of your medications? : No  Do you have all of your needed medical supplies or equipment (DME)?  (i.e. oxygen tank, CPAP, cane, etc.): Yes  Discharge follow-up appointment scheduled within 14 calendar days? : Yes  Discharge Follow Up Appointment Date: 23 Colin  Nicollett)  Discharge Follow Up Appointment Scheduled with?: Primary Care Provider (New provider)    Post-op ([facility] CTA Only)  If the patient had a surgery or procedure, do they have any questions for a nurse?: No      PLAN                                                      Outpatient Plan: Call for temperature greater than 100.4F, foul smelling vaginal discharge, bleeding more than 1 pad per hour for 2 hrs, pain not controlled by oral pain meds, severe constipation or severe nausea or vomiting.  2) She was instructed to follow-up with her primary OB in 6 weeks for a routine postpartum visit  3) She was instructed to continue her PNV on discharge if she wished to breast feed her infant.    No future appointments.      For any urgent concerns, please contact our 24 hour nurse triage line: [phone] ([phone])         MICAH Bridges  [phone]  [facility]

## 2023-12-03 ENCOUNTER — HEALTH MAINTENANCE LETTER (OUTPATIENT)
Age: 33
End: 2023-12-03

## 2024-04-24 ENCOUNTER — OFFICE VISIT (OUTPATIENT)
Dept: URGENT CARE | Facility: URGENT CARE | Age: 34
End: 2024-04-24

## 2024-04-24 VITALS
HEART RATE: 78 BPM | OXYGEN SATURATION: 96 % | RESPIRATION RATE: 20 BRPM | DIASTOLIC BLOOD PRESSURE: 63 MMHG | SYSTOLIC BLOOD PRESSURE: 102 MMHG | TEMPERATURE: 98 F

## 2024-04-24 DIAGNOSIS — J06.9 VIRAL URI WITH COUGH: Primary | ICD-10-CM

## 2024-04-24 PROCEDURE — 99203 OFFICE O/P NEW LOW 30 MIN: CPT | Performed by: PHYSICIAN ASSISTANT

## 2024-04-24 RX ORDER — ALBUTEROL SULFATE 90 UG/1
2 AEROSOL, METERED RESPIRATORY (INHALATION) EVERY 6 HOURS PRN
Qty: 18 G | Refills: 0 | Status: SHIPPED | OUTPATIENT
Start: 2024-04-24

## 2024-04-24 NOTE — LETTER
April 24, 2024      Bindu Mcleod  [address] [place] [address]        To Whom It May Concern:    Bindu Mcleod was seen in our clinic. Please excuse from work 4/24/24 - 4/26/24. She may return to work sooner if symptoms resolve.       Sincerely,      Adriana Booth

## 2024-04-24 NOTE — PROGRESS NOTES
Assessment & Plan     There are no diagnoses linked to this encounter.      No follow-ups on file.    Diagnosis and treatment plan was reviewed with patient and/or family.   We went over any labs or imaging. Discussed worsening symptoms or little to no relief despite treatment plan to follow-up with PCP or return to clinic.  Patient verbalizes understanding. All questions were addressed and answered.     Adriana Booth PA-C  [facility] [place]    CHIEF COMPLAINT:   Chief Complaint   Patient presents with    Cough     Cough/wheezing X2 days     Subjective     Bindu is a 33 year old female who presents to clinic today for evaluation of cough and wheezing. Symptoms started 2 days ago.  No fever or chills.    She is breastfeeding.   She has a history of exercise induced asthma, she has not been using her inhaler.       Past Medical History:   Diagnosis Date    Anxiety     Depression     Heart murmur     Ovarian cyst rupture      Past Surgical History:   Procedure Laterality Date    EYE SURGERY      tonsilectomy       Social History     Tobacco Use    Smoking status: Former    Smokeless tobacco: Not on file   Substance Use Topics    Alcohol use: Yes     Current Outpatient Medications   Medication Sig Dispense Refill    Prenatal MV-Min-Fe Fum-FA-DHA (PRENATAL 1 PO)       benzocaine (AMERICAINE) 20 % external aerosol Apply four times daily to perineum as needed for pain (Patient not taking: Reported on 4/24/2024) 57 g 0    docusate sodium (COLACE) 100 MG capsule Take 1 capsule (100 mg) by mouth daily (Patient not taking: Reported on 4/24/2024) 30 capsule 0    hydrocortisone, Perianal, (ANUSOL-HC) 2.5 % cream Place rectally 3 times daily as needed for hemorrhoids (Patient not taking: Reported on 4/24/2024) 30 g 0    ibuprofen (ADVIL/MOTRIN) 800 MG tablet Take 1 tablet (800 mg) by mouth every 6 hours as needed for other (cramping) (Patient not taking: Reported on 4/24/2024) 40 tablet 1    lanolin  ointment Apply topically every hour as needed for other (sore nipples) (Patient not taking: Reported on 4/24/2024) 7 g 3     No current facility-administered medications for this visit.     Allergies   Allergen Reactions    Azithromycin        10 point ROS of systems were all negative except for pertinent positives noted in my HPI.      Exam: ***  /63   Pulse 78   Temp 98  F (36.7  C) (Tympanic)   Resp 20   SpO2 96%   Constitutional: healthy, alert and no distress  Head: Normocephalic, atraumatic.  Eyes: conjunctiva clear, no drainage  ENT: TMs clear and shiny vannesa, nasal mucosa pink and moist, throat without tonsillar hypertrophy or erythema  Neck: neck is supple, no cervical lymphadenopathy or nuchal rigidity  Cardiovascular: RRR  Respiratory: CTA bilaterally, no rhonchi or rales  Gastrointestinal: soft and nontender  Skin: no rashes  Neurologic: Speech clear, gait normal. Moves all extremities.    No results found for any visits on 04/24/24.

## 2024-04-24 NOTE — PATIENT INSTRUCTIONS
Ok to use Mucinex DM for 3-5 days for cough  Humidified air at night  Albuterol for cough or wheezing   Lots of fluids and rest   Avoid sudafed to maintain your milk supply

## 2024-04-24 NOTE — PROGRESS NOTES
Assessment & Plan     1. Viral URI with cough  On exam, lungs are CTAB without sign of respiratory distress. Throat without PTA or RPA and TM clear B/L.  She does have a dry cough in clinic, but this does not sound like croup. No evidence for pneumonia or asthma exacerbation.   She has hx of exercise induced asthma, so will RX albuterol  Supportive cares encouraged    - albuterol (PROAIR HFA/PROVENTIL HFA/VENTOLIN HFA) 108 (90 Base) MCG/ACT inhaler; Inhale 2 puffs into the lungs every 6 hours as needed for shortness of breath, wheezing or cough  Dispense: 18 g; Refill: 0        Return in about 1 week (around 5/1/2024), or if symptoms worsen or fail to improve.    Diagnosis and treatment plan was reviewed with patient and/or family.   We went over any labs or imaging. Discussed worsening symptoms or little to no relief despite treatment plan to follow-up with PCP or return to clinic.  Patient verbalizes understanding. All questions were addressed and answered.     Adriana Booth PA-C  [facility] [place]    CHIEF COMPLAINT:   Chief Complaint   Patient presents with    Cough     Cough/wheezing X2 days     Subjective     Bindu is a 33 year old female who presents to clinic today for evaluation of cough and wheezing. Symptoms started 2 days ago. Cough is worse at night. She has not taking anything for her symptoms.   Hx of exercise induced asthma  She is breastfeeding.       Past Medical History:   Diagnosis Date    Anxiety     Depression     Heart murmur     Ovarian cyst rupture      Past Surgical History:   Procedure Laterality Date    EYE SURGERY      tonsilectomy       Social History     Tobacco Use    Smoking status: Former    Smokeless tobacco: Not on file   Substance Use Topics    Alcohol use: Yes     Current Outpatient Medications   Medication Sig Dispense Refill    albuterol (PROAIR HFA/PROVENTIL HFA/VENTOLIN HFA) 108 (90 Base) MCG/ACT inhaler Inhale 2 puffs into the lungs every 6 hours  as needed for shortness of breath, wheezing or cough 18 g 0    Prenatal MV-Min-Fe Fum-FA-DHA (PRENATAL 1 PO)       benzocaine (AMERICAINE) 20 % external aerosol Apply four times daily to perineum as needed for pain (Patient not taking: Reported on 4/24/2024) 57 g 0    docusate sodium (COLACE) 100 MG capsule Take 1 capsule (100 mg) by mouth daily (Patient not taking: Reported on 4/24/2024) 30 capsule 0    hydrocortisone, Perianal, (ANUSOL-HC) 2.5 % cream Place rectally 3 times daily as needed for hemorrhoids (Patient not taking: Reported on 4/24/2024) 30 g 0    ibuprofen (ADVIL/MOTRIN) 800 MG tablet Take 1 tablet (800 mg) by mouth every 6 hours as needed for other (cramping) (Patient not taking: Reported on 4/24/2024) 40 tablet 1    lanolin ointment Apply topically every hour as needed for other (sore nipples) (Patient not taking: Reported on 4/24/2024) 7 g 3     No current facility-administered medications for this visit.     Allergies   Allergen Reactions    Azithromycin        10 point ROS of systems were all negative except for pertinent positives noted in my HPI.      Exam:   /63   Pulse 78   Temp 98  F (36.7  C) (Tympanic)   Resp 20   SpO2 96%   Constitutional: healthy, alert and no distress  Head: Normocephalic, atraumatic.  Eyes: conjunctiva clear, no drainage  ENT: TMs clear and shiny vannesa, nasal mucosa pink and moist, throat mildly erythematous without trismus or drooling.  Neck: neck is supple, no cervical lymphadenopathy or nuchal rigidity  Cardiovascular: RRR  Respiratory: CTA bilaterally, no rhonchi or rales  Gastrointestinal: soft and nontender  Skin: no rashes  Neurologic: Speech clear, gait normal. Moves all extremities.    No results found for any visits on 04/24/24.

## 2025-01-05 ENCOUNTER — HEALTH MAINTENANCE LETTER (OUTPATIENT)
Age: 35
End: 2025-01-05

## 2025-02-05 ENCOUNTER — HOSPITAL ENCOUNTER (INPATIENT)
Facility: CLINIC | Age: 35
End: 2025-02-05
Attending: EMERGENCY MEDICINE | Admitting: ORTHOPAEDIC SURGERY
Payer: COMMERCIAL

## 2025-02-05 ENCOUNTER — APPOINTMENT (OUTPATIENT)
Dept: GENERAL RADIOLOGY | Facility: CLINIC | Age: 35
End: 2025-02-05
Attending: EMERGENCY MEDICINE
Payer: COMMERCIAL

## 2025-02-05 ENCOUNTER — HOSPITAL ENCOUNTER (INPATIENT)
Facility: CLINIC | Age: 35
Setting detail: SURGERY ADMIT
End: 2025-02-05
Attending: ORTHOPAEDIC SURGERY | Admitting: ORTHOPAEDIC SURGERY
Payer: COMMERCIAL

## 2025-02-05 DIAGNOSIS — S82.202A TIBIA/FIBULA FRACTURE, LEFT, CLOSED, INITIAL ENCOUNTER: Primary | ICD-10-CM

## 2025-02-05 DIAGNOSIS — S82.402A TIBIA/FIBULA FRACTURE, LEFT, CLOSED, INITIAL ENCOUNTER: Primary | ICD-10-CM

## 2025-02-05 LAB
ABO + RH BLD: NORMAL
BLD GP AB SCN SERPL QL: NEGATIVE
SPECIMEN EXP DATE BLD: NORMAL

## 2025-02-05 PROCEDURE — 99221 1ST HOSP IP/OBS SF/LOW 40: CPT | Performed by: INTERNAL MEDICINE

## 2025-02-05 PROCEDURE — 96376 TX/PRO/DX INJ SAME DRUG ADON: CPT

## 2025-02-05 PROCEDURE — 250N000009 HC RX 250: Performed by: EMERGENCY MEDICINE

## 2025-02-05 PROCEDURE — 73590 X-RAY EXAM OF LOWER LEG: CPT | Mod: LT

## 2025-02-05 PROCEDURE — 250N000011 HC RX IP 250 OP 636: Performed by: EMERGENCY MEDICINE

## 2025-02-05 PROCEDURE — 96375 TX/PRO/DX INJ NEW DRUG ADDON: CPT

## 2025-02-05 PROCEDURE — 36415 COLL VENOUS BLD VENIPUNCTURE: CPT

## 2025-02-05 PROCEDURE — 250N000013 HC RX MED GY IP 250 OP 250 PS 637: Performed by: INTERNAL MEDICINE

## 2025-02-05 PROCEDURE — G0378 HOSPITAL OBSERVATION PER HR: HCPCS

## 2025-02-05 PROCEDURE — 99291 CRITICAL CARE FIRST HOUR: CPT | Mod: 25

## 2025-02-05 PROCEDURE — 29505 APPLICATION LONG LEG SPLINT: CPT | Mod: LT

## 2025-02-05 PROCEDURE — 96374 THER/PROPH/DIAG INJ IV PUSH: CPT

## 2025-02-05 PROCEDURE — 86901 BLOOD TYPING SEROLOGIC RH(D): CPT

## 2025-02-05 PROCEDURE — 250N000011 HC RX IP 250 OP 636: Mod: JW | Performed by: INTERNAL MEDICINE

## 2025-02-05 RX ORDER — TRANEXAMIC ACID 10 MG/ML
1 INJECTION, SOLUTION INTRAVENOUS ONCE
Status: CANCELLED | OUTPATIENT
Start: 2025-02-05 | End: 2025-02-05

## 2025-02-05 RX ORDER — ONDANSETRON 2 MG/ML
4 INJECTION INTRAMUSCULAR; INTRAVENOUS EVERY 6 HOURS PRN
Status: DISCONTINUED | OUTPATIENT
Start: 2025-02-05 | End: 2025-02-11 | Stop reason: HOSPADM

## 2025-02-05 RX ORDER — ONDANSETRON 2 MG/ML
4 INJECTION INTRAMUSCULAR; INTRAVENOUS ONCE
Status: COMPLETED | OUTPATIENT
Start: 2025-02-05 | End: 2025-02-05

## 2025-02-05 RX ORDER — AMOXICILLIN 250 MG
1 CAPSULE ORAL 2 TIMES DAILY PRN
Status: DISCONTINUED | OUTPATIENT
Start: 2025-02-05 | End: 2025-02-11 | Stop reason: HOSPADM

## 2025-02-05 RX ORDER — KETAMINE HYDROCHLORIDE 10 MG/ML
20 INJECTION, SOLUTION INTRAMUSCULAR; INTRAVENOUS ONCE
Status: COMPLETED | OUTPATIENT
Start: 2025-02-05 | End: 2025-02-05

## 2025-02-05 RX ORDER — ACETAMINOPHEN 650 MG/1
650 SUPPOSITORY RECTAL EVERY 4 HOURS PRN
Status: DISCONTINUED | OUTPATIENT
Start: 2025-02-05 | End: 2025-02-11 | Stop reason: HOSPADM

## 2025-02-05 RX ORDER — POLYETHYLENE GLYCOL 3350 17 G
2 POWDER IN PACKET (EA) ORAL
Status: DISCONTINUED | OUTPATIENT
Start: 2025-02-05 | End: 2025-02-11 | Stop reason: HOSPADM

## 2025-02-05 RX ORDER — ONDANSETRON 4 MG/1
4 TABLET, ORALLY DISINTEGRATING ORAL EVERY 6 HOURS PRN
Status: DISCONTINUED | OUTPATIENT
Start: 2025-02-05 | End: 2025-02-11 | Stop reason: HOSPADM

## 2025-02-05 RX ORDER — ACETAMINOPHEN 325 MG/1
650 TABLET ORAL EVERY 4 HOURS PRN
Status: DISCONTINUED | OUTPATIENT
Start: 2025-02-05 | End: 2025-02-11 | Stop reason: HOSPADM

## 2025-02-05 RX ORDER — CEFAZOLIN SODIUM 2 G/100ML
2 INJECTION, SOLUTION INTRAVENOUS
Status: CANCELLED | OUTPATIENT
Start: 2025-02-05

## 2025-02-05 RX ORDER — PROCHLORPERAZINE MALEATE 5 MG/1
10 TABLET ORAL EVERY 6 HOURS PRN
Status: DISCONTINUED | OUTPATIENT
Start: 2025-02-05 | End: 2025-02-11 | Stop reason: HOSPADM

## 2025-02-05 RX ORDER — ONDANSETRON 2 MG/ML
INJECTION INTRAMUSCULAR; INTRAVENOUS
Status: DISCONTINUED
Start: 2025-02-05 | End: 2025-02-05 | Stop reason: HOSPADM

## 2025-02-05 RX ORDER — CEFAZOLIN SODIUM 2 G/100ML
2 INJECTION, SOLUTION INTRAVENOUS SEE ADMIN INSTRUCTIONS
Status: CANCELLED | OUTPATIENT
Start: 2025-02-05

## 2025-02-05 RX ORDER — HYDROMORPHONE HYDROCHLORIDE 1 MG/ML
0.5 INJECTION, SOLUTION INTRAMUSCULAR; INTRAVENOUS; SUBCUTANEOUS EVERY 4 HOURS PRN
Status: DISCONTINUED | OUTPATIENT
Start: 2025-02-05 | End: 2025-02-05

## 2025-02-05 RX ORDER — AMOXICILLIN 250 MG
2 CAPSULE ORAL 2 TIMES DAILY PRN
Status: DISCONTINUED | OUTPATIENT
Start: 2025-02-05 | End: 2025-02-11 | Stop reason: HOSPADM

## 2025-02-05 RX ORDER — HYDROMORPHONE HYDROCHLORIDE 2 MG/1
2 TABLET ORAL EVERY 4 HOURS PRN
Status: DISCONTINUED | OUTPATIENT
Start: 2025-02-05 | End: 2025-02-07

## 2025-02-05 RX ORDER — HYDROMORPHONE HYDROCHLORIDE 1 MG/ML
0.5 INJECTION, SOLUTION INTRAMUSCULAR; INTRAVENOUS; SUBCUTANEOUS
Status: DISCONTINUED | OUTPATIENT
Start: 2025-02-05 | End: 2025-02-05

## 2025-02-05 RX ADMIN — HYDROMORPHONE HYDROCHLORIDE 0.6 MG: 1 INJECTION, SOLUTION INTRAMUSCULAR; INTRAVENOUS; SUBCUTANEOUS at 22:58

## 2025-02-05 RX ADMIN — Medication 20 MG: at 21:02

## 2025-02-05 RX ADMIN — ONDANSETRON 4 MG: 2 INJECTION INTRAMUSCULAR; INTRAVENOUS at 17:25

## 2025-02-05 RX ADMIN — HYDROMORPHONE HYDROCHLORIDE 0.5 MG: 1 INJECTION, SOLUTION INTRAMUSCULAR; INTRAVENOUS; SUBCUTANEOUS at 21:06

## 2025-02-05 RX ADMIN — ONDANSETRON 4 MG: 2 INJECTION INTRAMUSCULAR; INTRAVENOUS at 21:17

## 2025-02-05 RX ADMIN — ONDANSETRON 4 MG: 2 INJECTION, SOLUTION INTRAMUSCULAR; INTRAVENOUS at 17:25

## 2025-02-05 RX ADMIN — ACETAMINOPHEN 650 MG: 325 TABLET, FILM COATED ORAL at 22:05

## 2025-02-05 RX ADMIN — HYDROMORPHONE HYDROCHLORIDE 0.5 MG: 1 INJECTION, SOLUTION INTRAMUSCULAR; INTRAVENOUS; SUBCUTANEOUS at 18:14

## 2025-02-05 RX ADMIN — HYDROMORPHONE HYDROCHLORIDE 0.5 MG: 1 INJECTION, SOLUTION INTRAMUSCULAR; INTRAVENOUS; SUBCUTANEOUS at 17:37

## 2025-02-05 ASSESSMENT — ACTIVITIES OF DAILY LIVING (ADL)
ADLS_ACUITY_SCORE: 46

## 2025-02-05 ASSESSMENT — COLUMBIA-SUICIDE SEVERITY RATING SCALE - C-SSRS
2. HAVE YOU ACTUALLY HAD ANY THOUGHTS OF KILLING YOURSELF IN THE PAST MONTH?: NO
6. HAVE YOU EVER DONE ANYTHING, STARTED TO DO ANYTHING, OR PREPARED TO DO ANYTHING TO END YOUR LIFE?: NO
1. IN THE PAST MONTH, HAVE YOU WISHED YOU WERE DEAD OR WISHED YOU COULD GO TO SLEEP AND NOT WAKE UP?: NO

## 2025-02-05 NOTE — ED TRIAGE NOTES
Arrived EMS after fall at pt apartment complex. Pt fell on pavement while holding 18 month old dtr due to ice and slipping. Denies hitting head or LOC. Edema +2, possible deformity, and severe pain LLE. 50 mcg fentanyl 1653 ems in 20 PIV LAC. VSS. A/OX4     Triage Assessment (Adult)       Row Name 02/05/25 1704          Triage Assessment    Airway WDL WDL        Respiratory WDL    Respiratory WDL WDL        Skin Circulation/Temperature WDL    Skin Circulation/Temperature WDL WDL        Cardiac WDL    Cardiac WDL WDL        Peripheral/Neurovascular WDL    Peripheral Neurovascular WDL WDL        Cognitive/Neuro/Behavioral WDL    Cognitive/Neuro/Behavioral WDL WDL

## 2025-02-06 ENCOUNTER — APPOINTMENT (OUTPATIENT)
Dept: GENERAL RADIOLOGY | Facility: CLINIC | Age: 35
End: 2025-02-06
Attending: ORTHOPAEDIC SURGERY
Payer: COMMERCIAL

## 2025-02-06 VITALS
TEMPERATURE: 97.2 F | DIASTOLIC BLOOD PRESSURE: 62 MMHG | HEART RATE: 74 BPM | OXYGEN SATURATION: 96 % | BODY MASS INDEX: 26.84 KG/M2 | WEIGHT: 151.46 LBS | SYSTOLIC BLOOD PRESSURE: 110 MMHG | RESPIRATION RATE: 16 BRPM | HEIGHT: 63 IN

## 2025-02-06 LAB
ANION GAP SERPL CALCULATED.3IONS-SCNC: 13 MMOL/L (ref 7–15)
BASOPHILS # BLD AUTO: 0 10E3/UL (ref 0–0.2)
BASOPHILS NFR BLD AUTO: 0 %
BUN SERPL-MCNC: 11.5 MG/DL (ref 6–20)
CALCIUM SERPL-MCNC: 8.8 MG/DL (ref 8.8–10.4)
CHLORIDE SERPL-SCNC: 101 MMOL/L (ref 98–107)
CREAT SERPL-MCNC: 0.61 MG/DL (ref 0.51–0.95)
EGFRCR SERPLBLD CKD-EPI 2021: >90 ML/MIN/1.73M2
EOSINOPHIL # BLD AUTO: 0.1 10E3/UL (ref 0–0.7)
EOSINOPHIL NFR BLD AUTO: 1 %
ERYTHROCYTE [DISTWIDTH] IN BLOOD BY AUTOMATED COUNT: 11.7 % (ref 10–15)
GLUCOSE SERPL-MCNC: 96 MG/DL (ref 70–99)
HCG INTACT+B SERPL-ACNC: <1 MIU/ML
HCO3 SERPL-SCNC: 24 MMOL/L (ref 22–29)
HCT VFR BLD AUTO: 42.7 % (ref 35–47)
HGB BLD-MCNC: 14.3 G/DL (ref 11.7–15.7)
IMM GRANULOCYTES # BLD: 0 10E3/UL
IMM GRANULOCYTES NFR BLD: 0 %
LYMPHOCYTES # BLD AUTO: 1.8 10E3/UL (ref 0.8–5.3)
LYMPHOCYTES NFR BLD AUTO: 20 %
MCH RBC QN AUTO: 30.3 PG (ref 26.5–33)
MCHC RBC AUTO-ENTMCNC: 33.5 G/DL (ref 31.5–36.5)
MCV RBC AUTO: 91 FL (ref 78–100)
MONOCYTES # BLD AUTO: 1.1 10E3/UL (ref 0–1.3)
MONOCYTES NFR BLD AUTO: 12 %
NEUTROPHILS # BLD AUTO: 6 10E3/UL (ref 1.6–8.3)
NEUTROPHILS NFR BLD AUTO: 67 %
NRBC # BLD AUTO: 0 10E3/UL
NRBC BLD AUTO-RTO: 0 /100
PLATELET # BLD AUTO: 197 10E3/UL (ref 150–450)
POTASSIUM SERPL-SCNC: 4.1 MMOL/L (ref 3.4–5.3)
RBC # BLD AUTO: 4.72 10E6/UL (ref 3.8–5.2)
SODIUM SERPL-SCNC: 138 MMOL/L (ref 135–145)
WBC # BLD AUTO: 9.1 10E3/UL (ref 4–11)

## 2025-02-06 PROCEDURE — G0378 HOSPITAL OBSERVATION PER HR: HCPCS

## 2025-02-06 PROCEDURE — 258N000003 HC RX IP 258 OP 636: Performed by: ANESTHESIOLOGY

## 2025-02-06 PROCEDURE — 710N000009 HC RECOVERY PHASE 1, LEVEL 1, PER MIN: Performed by: ORTHOPAEDIC SURGERY

## 2025-02-06 PROCEDURE — 250N000013 HC RX MED GY IP 250 OP 250 PS 637: Performed by: INTERNAL MEDICINE

## 2025-02-06 PROCEDURE — 120N000001 HC R&B MED SURG/OB

## 2025-02-06 PROCEDURE — 999N000141 HC STATISTIC PRE-PROCEDURE NURSING ASSESSMENT: Performed by: ORTHOPAEDIC SURGERY

## 2025-02-06 PROCEDURE — 250N000011 HC RX IP 250 OP 636: Performed by: INTERNAL MEDICINE

## 2025-02-06 PROCEDURE — C1769 GUIDE WIRE: HCPCS | Performed by: ORTHOPAEDIC SURGERY

## 2025-02-06 PROCEDURE — 272N000001 HC OR GENERAL SUPPLY STERILE: Performed by: ORTHOPAEDIC SURGERY

## 2025-02-06 PROCEDURE — 250N000013 HC RX MED GY IP 250 OP 250 PS 637

## 2025-02-06 PROCEDURE — 250N000011 HC RX IP 250 OP 636: Mod: JZ | Performed by: ORTHOPAEDIC SURGERY

## 2025-02-06 PROCEDURE — 85025 COMPLETE CBC W/AUTO DIFF WBC: CPT

## 2025-02-06 PROCEDURE — 250N000009 HC RX 250: Performed by: ORTHOPAEDIC SURGERY

## 2025-02-06 PROCEDURE — 99232 SBSQ HOSP IP/OBS MODERATE 35: CPT | Performed by: INTERNAL MEDICINE

## 2025-02-06 PROCEDURE — C1713 ANCHOR/SCREW BN/BN,TIS/BN: HCPCS | Performed by: ORTHOPAEDIC SURGERY

## 2025-02-06 PROCEDURE — 82310 ASSAY OF CALCIUM: CPT

## 2025-02-06 PROCEDURE — 370N000017 HC ANESTHESIA TECHNICAL FEE, PER MIN: Performed by: ORTHOPAEDIC SURGERY

## 2025-02-06 PROCEDURE — 360N000083 HC SURGERY LEVEL 3 W/ FLUORO, PER MIN: Performed by: ORTHOPAEDIC SURGERY

## 2025-02-06 PROCEDURE — 250N000011 HC RX IP 250 OP 636: Performed by: ANESTHESIOLOGY

## 2025-02-06 PROCEDURE — 999N000179 XR SURGERY CARM FLUORO LESS THAN 5 MIN W STILLS

## 2025-02-06 PROCEDURE — 250N000025 HC SEVOFLURANE, PER MIN: Performed by: ORTHOPAEDIC SURGERY

## 2025-02-06 PROCEDURE — 250N000011 HC RX IP 250 OP 636

## 2025-02-06 PROCEDURE — 96376 TX/PRO/DX INJ SAME DRUG ADON: CPT

## 2025-02-06 PROCEDURE — 84702 CHORIONIC GONADOTROPIN TEST: CPT

## 2025-02-06 PROCEDURE — 0QSH06Z REPOSITION LEFT TIBIA WITH INTRAMEDULLARY INTERNAL FIXATION DEVICE, OPEN APPROACH: ICD-10-PCS | Performed by: ORTHOPAEDIC SURGERY

## 2025-02-06 PROCEDURE — 36415 COLL VENOUS BLD VENIPUNCTURE: CPT

## 2025-02-06 PROCEDURE — 272N000002 HC OR SUPPLY OTHER OPNP: Performed by: ORTHOPAEDIC SURGERY

## 2025-02-06 PROCEDURE — 271N000001 HC OR GENERAL SUPPLY NON-STERILE: Performed by: ORTHOPAEDIC SURGERY

## 2025-02-06 DEVICE — IMPLANTABLE DEVICE: Type: IMPLANTABLE DEVICE | Site: LEG | Status: FUNCTIONAL

## 2025-02-06 DEVICE — LOW PROF LCKNG SCREW F/IM NAIL Ø 5.0MM / L 32MM / XL25/ STER: Type: IMPLANTABLE DEVICE | Site: LEG | Status: FUNCTIONAL

## 2025-02-06 RX ORDER — BUPIVACAINE HYDROCHLORIDE 2.5 MG/ML
INJECTION, SOLUTION EPIDURAL; INFILTRATION; INTRACAUDAL PRN
Status: DISCONTINUED | OUTPATIENT
Start: 2025-02-06 | End: 2025-02-06 | Stop reason: HOSPADM

## 2025-02-06 RX ORDER — CEFAZOLIN SODIUM 1 G/3ML
1 INJECTION, POWDER, FOR SOLUTION INTRAMUSCULAR; INTRAVENOUS EVERY 8 HOURS
Status: COMPLETED | OUTPATIENT
Start: 2025-02-06 | End: 2025-02-07

## 2025-02-06 RX ORDER — MAGNESIUM HYDROXIDE 1200 MG/15ML
LIQUID ORAL PRN
Status: DISCONTINUED | OUTPATIENT
Start: 2025-02-06 | End: 2025-02-06 | Stop reason: HOSPADM

## 2025-02-06 RX ORDER — TRANEXAMIC ACID 10 MG/ML
1 INJECTION, SOLUTION INTRAVENOUS ONCE
Status: COMPLETED | OUTPATIENT
Start: 2025-02-06 | End: 2025-02-06

## 2025-02-06 RX ORDER — ASPIRIN 81 MG/1
81 TABLET ORAL 2 TIMES DAILY
Status: DISCONTINUED | OUTPATIENT
Start: 2025-02-06 | End: 2025-02-11 | Stop reason: HOSPADM

## 2025-02-06 RX ORDER — HYDROMORPHONE HCL IN WATER/PF 6 MG/30 ML
0.2 PATIENT CONTROLLED ANALGESIA SYRINGE INTRAVENOUS EVERY 5 MIN PRN
Status: DISCONTINUED | OUTPATIENT
Start: 2025-02-06 | End: 2025-02-06 | Stop reason: HOSPADM

## 2025-02-06 RX ORDER — OXYCODONE HYDROCHLORIDE 5 MG/1
5 TABLET ORAL EVERY 4 HOURS PRN
Status: CANCELLED | OUTPATIENT
Start: 2025-02-06

## 2025-02-06 RX ORDER — POLYETHYLENE GLYCOL 3350 17 G/17G
17 POWDER, FOR SOLUTION ORAL DAILY
Status: DISCONTINUED | OUTPATIENT
Start: 2025-02-07 | End: 2025-02-11 | Stop reason: HOSPADM

## 2025-02-06 RX ORDER — CEFAZOLIN SODIUM/WATER 2 G/20 ML
2 SYRINGE (ML) INTRAVENOUS SEE ADMIN INSTRUCTIONS
Status: DISCONTINUED | OUTPATIENT
Start: 2025-02-06 | End: 2025-02-06 | Stop reason: HOSPADM

## 2025-02-06 RX ORDER — PANTOPRAZOLE SODIUM 40 MG/1
40 TABLET, DELAYED RELEASE ORAL
Status: DISCONTINUED | OUTPATIENT
Start: 2025-02-07 | End: 2025-02-11 | Stop reason: HOSPADM

## 2025-02-06 RX ORDER — ONDANSETRON 2 MG/ML
4 INJECTION INTRAMUSCULAR; INTRAVENOUS ONCE
Status: COMPLETED | OUTPATIENT
Start: 2025-02-06 | End: 2025-02-06

## 2025-02-06 RX ORDER — ACETAMINOPHEN 325 MG/1
650 TABLET ORAL EVERY 8 HOURS
Status: DISCONTINUED | OUTPATIENT
Start: 2025-02-06 | End: 2025-02-11 | Stop reason: HOSPADM

## 2025-02-06 RX ORDER — SODIUM CHLORIDE, SODIUM LACTATE, POTASSIUM CHLORIDE, CALCIUM CHLORIDE 600; 310; 30; 20 MG/100ML; MG/100ML; MG/100ML; MG/100ML
INJECTION, SOLUTION INTRAVENOUS CONTINUOUS
Status: DISCONTINUED | OUTPATIENT
Start: 2025-02-06 | End: 2025-02-06 | Stop reason: HOSPADM

## 2025-02-06 RX ORDER — FENTANYL CITRATE 50 UG/ML
100 INJECTION, SOLUTION INTRAMUSCULAR; INTRAVENOUS ONCE
Status: COMPLETED | OUTPATIENT
Start: 2025-02-06 | End: 2025-02-06

## 2025-02-06 RX ORDER — DEXAMETHASONE SODIUM PHOSPHATE 4 MG/ML
4 INJECTION, SOLUTION INTRA-ARTICULAR; INTRALESIONAL; INTRAMUSCULAR; INTRAVENOUS; SOFT TISSUE
Status: DISCONTINUED | OUTPATIENT
Start: 2025-02-06 | End: 2025-02-06 | Stop reason: HOSPADM

## 2025-02-06 RX ORDER — OXYCODONE HYDROCHLORIDE 5 MG/1
10 TABLET ORAL EVERY 4 HOURS PRN
Status: CANCELLED | OUTPATIENT
Start: 2025-02-06

## 2025-02-06 RX ORDER — ONDANSETRON 2 MG/ML
4 INJECTION INTRAMUSCULAR; INTRAVENOUS EVERY 30 MIN PRN
Status: DISCONTINUED | OUTPATIENT
Start: 2025-02-06 | End: 2025-02-06 | Stop reason: HOSPADM

## 2025-02-06 RX ORDER — CEFAZOLIN SODIUM/WATER 2 G/20 ML
2 SYRINGE (ML) INTRAVENOUS
Status: COMPLETED | OUTPATIENT
Start: 2025-02-06 | End: 2025-02-06

## 2025-02-06 RX ORDER — FENTANYL CITRATE 50 UG/ML
50 INJECTION, SOLUTION INTRAMUSCULAR; INTRAVENOUS EVERY 5 MIN PRN
Status: DISCONTINUED | OUTPATIENT
Start: 2025-02-06 | End: 2025-02-06 | Stop reason: HOSPADM

## 2025-02-06 RX ORDER — NALOXONE HYDROCHLORIDE 0.4 MG/ML
0.1 INJECTION, SOLUTION INTRAMUSCULAR; INTRAVENOUS; SUBCUTANEOUS
Status: DISCONTINUED | OUTPATIENT
Start: 2025-02-06 | End: 2025-02-06 | Stop reason: HOSPADM

## 2025-02-06 RX ORDER — HYDROMORPHONE HCL IN WATER/PF 6 MG/30 ML
0.4 PATIENT CONTROLLED ANALGESIA SYRINGE INTRAVENOUS EVERY 5 MIN PRN
Status: DISCONTINUED | OUTPATIENT
Start: 2025-02-06 | End: 2025-02-06 | Stop reason: HOSPADM

## 2025-02-06 RX ORDER — IBUPROFEN 400 MG/1
400 TABLET, FILM COATED ORAL EVERY 6 HOURS PRN
Status: DISCONTINUED | OUTPATIENT
Start: 2025-02-06 | End: 2025-02-07

## 2025-02-06 RX ORDER — FENTANYL CITRATE 50 UG/ML
25 INJECTION, SOLUTION INTRAMUSCULAR; INTRAVENOUS EVERY 5 MIN PRN
Status: DISCONTINUED | OUTPATIENT
Start: 2025-02-06 | End: 2025-02-06 | Stop reason: HOSPADM

## 2025-02-06 RX ORDER — ONDANSETRON 4 MG/1
4 TABLET, ORALLY DISINTEGRATING ORAL EVERY 30 MIN PRN
Status: DISCONTINUED | OUTPATIENT
Start: 2025-02-06 | End: 2025-02-06 | Stop reason: HOSPADM

## 2025-02-06 RX ORDER — LIDOCAINE 40 MG/G
CREAM TOPICAL
Status: DISCONTINUED | OUTPATIENT
Start: 2025-02-06 | End: 2025-02-11 | Stop reason: HOSPADM

## 2025-02-06 RX ADMIN — HYDROMORPHONE HYDROCHLORIDE 2 MG: 2 TABLET ORAL at 04:00

## 2025-02-06 RX ADMIN — IBUPROFEN 400 MG: 400 TABLET, FILM COATED ORAL at 19:48

## 2025-02-06 RX ADMIN — ASPIRIN 81 MG: 81 TABLET, COATED ORAL at 19:45

## 2025-02-06 RX ADMIN — CEFAZOLIN 1 G: 1 INJECTION, POWDER, FOR SOLUTION INTRAMUSCULAR; INTRAVENOUS at 21:46

## 2025-02-06 RX ADMIN — ACETAMINOPHEN 650 MG: 325 TABLET, FILM COATED ORAL at 17:46

## 2025-02-06 RX ADMIN — ONDANSETRON 4 MG: 2 INJECTION INTRAMUSCULAR; INTRAVENOUS at 07:51

## 2025-02-06 RX ADMIN — FENTANYL CITRATE 50 MCG: 50 INJECTION, SOLUTION INTRAMUSCULAR; INTRAVENOUS at 16:22

## 2025-02-06 RX ADMIN — FENTANYL CITRATE 25 MCG: 50 INJECTION, SOLUTION INTRAMUSCULAR; INTRAVENOUS at 16:15

## 2025-02-06 RX ADMIN — Medication 5 MG: at 21:51

## 2025-02-06 RX ADMIN — ONDANSETRON 4 MG: 2 INJECTION, SOLUTION INTRAMUSCULAR; INTRAVENOUS at 16:30

## 2025-02-06 RX ADMIN — ONDANSETRON 4 MG: 2 INJECTION INTRAMUSCULAR; INTRAVENOUS at 10:26

## 2025-02-06 RX ADMIN — HYDROMORPHONE HYDROCHLORIDE 0.6 MG: 1 INJECTION, SOLUTION INTRAMUSCULAR; INTRAVENOUS; SUBCUTANEOUS at 07:49

## 2025-02-06 RX ADMIN — FENTANYL CITRATE 100 MCG: 50 INJECTION, SOLUTION INTRAMUSCULAR; INTRAVENOUS at 10:14

## 2025-02-06 RX ADMIN — SODIUM CHLORIDE, POTASSIUM CHLORIDE, SODIUM LACTATE AND CALCIUM CHLORIDE: 600; 310; 30; 20 INJECTION, SOLUTION INTRAVENOUS at 10:14

## 2025-02-06 ASSESSMENT — ACTIVITIES OF DAILY LIVING (ADL)
ADLS_ACUITY_SCORE: 46
ADLS_ACUITY_SCORE: 23
ADLS_ACUITY_SCORE: 22
ADLS_ACUITY_SCORE: 46
ADLS_ACUITY_SCORE: 46
ADLS_ACUITY_SCORE: 23
ADLS_ACUITY_SCORE: 46
ADLS_ACUITY_SCORE: 23
ADLS_ACUITY_SCORE: 46

## 2025-02-06 NOTE — ED NOTES
BINDU Del Cid; pt possibly may have surgery at [facility]. Ortho will update.

## 2025-02-06 NOTE — H&P
[facility]       Hospitalist History & Physical     Assessment & Plan     ASSESSMENT    34F with history of MDD and CHARISMA presents with left leg pain after a ground-level fall and found to have mildly displaced obliquely oriented distal left tibial shaft and fibular neck fractures.    PLAN    Displaced Left Tibial Shaft & Fibular Neck Fractures  Mechanical Ground-Level Fall  -Presents after a ground-level fall while slipping on the ice  -XR with mildly displaced obliquely oriented distal left tibial shaft and fibular neck fractures  -May not have OR time at [facility] tomorrow May transfer to [facility] for surgery  -However, no beds at [facility] so patient will be admitted to [facility] for the evening  -Pain regimen in place  -NPO@MN for orthopedic surgery evaluation and likely surgery    MDD & CHARISMA  -Not on any prescription medications for these issues    DVT Prophy  -SCDs    Disposition  -Medically Ready for Discharge: Anticipated in 2-4 Days       Harish Knight MD    History of Present Illness     Bindu Mcleod is a 34 year old with history of MDD and CHARISMA presents with left leg pain after a ground-level fall.  Patient was in her normal state of health until this afternoon when she was walking while carrying her daughter and slipped on the ice falling onto her left side on the pavement.  Does not believe she hit her head.  No LOC event.  Started experiencing severe pain in her left leg.  No other injuries.  In the ED, VSS.  Labs currently pending.  XR with evidence of Mildly displaced obliquely oriented distal left tibial shaft and fibular neck fractures.  Case discussed with orthopedic surgery.  May not have OR time at [facility] tomorrow May transfer to [facility] for surgery.  However, no beds at [facility] so patient will be admitted to [facility] for the evening.    Review of Systems     A Comprehensive greater than 10 system review of systems was carried out.  Pertinent positives and  "negatives are noted above.  Otherwise negative for contributory information.     Past Medical History     Past Medical History:   Diagnosis Date    Anxiety     Depression     Heart murmur     Ovarian cyst rupture      Medications     Current Outpatient Medications   Medication Sig Dispense Refill    albuterol (PROAIR HFA/PROVENTIL HFA/VENTOLIN HFA) 108 (90 Base) MCG/ACT inhaler Inhale 2 puffs into the lungs every 6 hours as needed for shortness of breath, wheezing or cough 18 g 0    benzocaine (AMERICAINE) 20 % external aerosol Apply four times daily to perineum as needed for pain (Patient not taking: Reported on 4/24/2024) 57 g 0    docusate sodium (COLACE) 100 MG capsule Take 1 capsule (100 mg) by mouth daily (Patient not taking: Reported on 4/24/2024) 30 capsule 0    hydrocortisone, Perianal, (ANUSOL-HC) 2.5 % cream Place rectally 3 times daily as needed for hemorrhoids (Patient not taking: Reported on 4/24/2024) 30 g 0    ibuprofen (ADVIL/MOTRIN) 800 MG tablet Take 1 tablet (800 mg) by mouth every 6 hours as needed for other (cramping) (Patient not taking: Reported on 4/24/2024) 40 tablet 1    lanolin ointment Apply topically every hour as needed for other (sore nipples) (Patient not taking: Reported on 4/24/2024) 7 g 3    Prenatal MV-Min-Fe Fum-FA-DHA (PRENATAL 1 PO)         Past Surgical History     Past Surgical History:   Procedure Laterality Date    EYE SURGERY      tonsilectomy       Family History   History reviewed. No pertinent family history.    Allergies     Allergies   Allergen Reactions    Azithromycin      Social History     Social History     Tobacco Use    Smoking status: Former    Smokeless tobacco: Not on file   Substance Use Topics    Alcohol use: Yes     Physical Exam   Blood pressure 107/69, pulse 77, temperature 98.7  F (37.1  C), temperature source Temporal, resp. rate 20, height 1.6 m (5' 3\"), weight 68.7 kg (151 lb 7.3 oz), SpO2 98%, currently breastfeeding.    General: Ill appearing, " cooperative with exam, in NAD.  HEENT: Atraumatic. No erythema in posterior pharynx.  Lymph: No cervical or inguinal lymphadenopathy.  Cardiac: RRR. No murmurs.  Lungs: CTAB. Nl WOB.  Abd: Non-tender. No rebound or gaurding. Nl bowel sounds.  Ext: LLE externally rotated but neurovascularly intact.  Skin: No rashes, abrasions, or contusions.  Psych: A&Ox3. Nl affect.  Neuro: 5/5 strength. Sensation intact.    Labs & Imaging     Reviewed and Pertinent results discussed in assessment and plan.

## 2025-02-06 NOTE — ED PROVIDER NOTES
"    History     Chief Complaint:  Fall       HPI   Bindu Mcleod is a 34 year old female with no past medical history who presents emergency department for evaluation of pain in her left lower leg.  Patient was on the pavement while holding her 18-month-old and slipped on ice and her leg went out from under her.  Denies hitting her head or loss consciousness or any neck or back pain or hip pain.  Reports some numbness around the area of pain but denies any numbness of the foot or ankle.  Called EMS and got 50 mics of fentanyl      Independent Historian:    None    Review of External Notes:  Discharge summary reviewed from 2023, patient underwent spontaneous vaginal delivery    Medications:    albuterol (PROAIR HFA/PROVENTIL HFA/VENTOLIN HFA) 108 (90 Base) MCG/ACT inhaler  benzocaine (AMERICAINE) 20 % external aerosol  docusate sodium (COLACE) 100 MG capsule  hydrocortisone, Perianal, (ANUSOL-HC) 2.5 % cream  ibuprofen (ADVIL/MOTRIN) 800 MG tablet  lanolin ointment  Prenatal MV-Min-Fe Fum-FA-DHA (PRENATAL 1 PO)        Past Medical History:    Past Medical History:   Diagnosis Date    Anxiety     Depression     Heart murmur     Ovarian cyst rupture        Past Surgical History:    Past Surgical History:   Procedure Laterality Date    EYE SURGERY      tonsilectomy            Physical Exam   Patient Vitals for the past 24 hrs:   BP Temp Temp src Pulse Resp SpO2 Height Weight   02/05/25 1713 -- -- -- -- -- 98 % -- --   02/05/25 1708 107/69 98.7  F (37.1  C) Temporal 77 20 (!) 88 % 1.6 m (5' 3\") 68.7 kg (151 lb 7.3 oz)        Physical Exam  GENERAL: Awake, alert, appears to be uncomfortable and in pain  CARDIOVASCULAR: Normal peripheral perfusion  LUNGS: Breathing comfortably on room air  ABDOMEN: Nondistended   EXTREMITIES: Deformity to left distal tibia and tenderness over proximal fibula, no tenderness to palpation of the knee, hip, femur, back or left foot, 2+ left DP and TP pulses, left shin compartment has " some swelling but compartments are compressible  NEURO: Can wiggle toes, normal sensation      Emergency Department Course     Imaging:  XR Tibia and Fibula Left 2 Views   Final Result   IMPRESSION: Mildly displaced obliquely oriented distal left tibial shaft and fibular neck fractures. Joint spaces are preserved.          Laboratory:  Labs Ordered and Resulted from Time of ED Arrival to Time of ED Departure - No data to display     Procedures     Splint Placement     Procedure: Splint Placement     Indication: Fracture    Consent: Verbal     Location: Left Leg    Preparation: Wounds were cleansed and dressed with a non-adherent bandage     Procedure detail:   Splint was applied by Myself and the PA student intact  Splint type: Long leg with stirrup  Splint materilal: Plaster  After placement I checked and adjusted the fit as needed to ensure proper positioning/fit  Sensation and circulation are intact after splint placement    Patient Status: The patient tolerated the procedure well: Yes. There were no complications.      Emergency Department Course & Assessments:    Interventions:  Medications   HYDROmorphone (PF) (DILAUDID) injection 0.5 mg (0.5 mg Intravenous $Given 25 181)   ketamine (KETALAR) injection 20 mg (has no administration in time range)   ondansetron (ZOFRAN) injection 4 mg (4 mg Intravenous $Given 25 1725)        Independent Interpretation (X-rays, CTs, rhythm strip):  Reviewed x-ray which showed a distal tibia shaft fracture and proximal fibula fracture    Consultations/Discussion of Management or Tests:  Dr. Luke [facility], admitting hospitalist        Disposition:  Plan for eventual transfer to [facility] for surgery tomorrow    Impression & Plan         Medical Decision Makin-year-old female who presents emergency department for evaluation of a fall, she is neurovascular intact, x-ray showed a tib-fib fracture, she was splinted, discussed with orthopedic surgery.  No signs of  compartment syndrome at this time and no evidence of any other trauma on exam.  Unfortunately the operating room is not available here tomorrow.  Therefore patient will be transferred to [facility].  We talked with patient placement, there are no hospital beds available at [facility] now so patient will be transferred tomorrow pending availability.  The hospitalist at [facility], graciously wrote admitting orders pending transfer    Diagnosis:    ICD-10-CM    1. Tibia/fibula fracture, left, closed, initial encounter  S82.202A Case Request: OPEN REDUCTION INTERNAL FIXATION, FRACTURE, TIBIA USING INTRAMEDULLARY ANDREW    S82.402A Case Request: OPEN REDUCTION INTERNAL FIXATION, FRACTURE, TIBIA USING INTRAMEDULLARY ANDREW           Discharge Medications:  New Prescriptions    No medications on file               Yodit Cid MD  02/05/25 2220

## 2025-02-06 NOTE — PHARMACY-ADMISSION MEDICATION HISTORY
Pharmacist Admission Medication History    Admission medication history is complete. The information provided in this note is only as accurate as the sources available at the time of the update.    Information Source(s): Patient via in-person    Pertinent Information:       Changes made to PTA medication list:  Added: None  Deleted: Benzocaine, docusate, ibu, anusol, lanolin, prenatal.   Changed: None    Allergies reviewed with patient and updates made in EHR: yes    Medication History Completed By: Penelope Díaz RPH 2/6/2025 7:49 AM    PTA Med List   Medication Sig Last Dose/Taking    albuterol (PROAIR HFA/PROVENTIL HFA/VENTOLIN HFA) 108 (90 Base) MCG/ACT inhaler Inhale 2 puffs into the lungs every 6 hours as needed for shortness of breath, wheezing or cough Taking As Needed

## 2025-02-06 NOTE — CONSULTS
[facility]    Orthopedic Consultation    Bindu Mcleod MRN# [id number]   Age: 34 year old YOB: 1990     Date of Admission:  2/5/2025    Reason for consult: Fracture of the tibia, displaced, left  Fibular neck fracture, left       Requesting provider: Alonzo Kong MD        Level of consult: Consult, follow and place orders           Assessment and Plan:   Assessment:   Displaced fracture tibia and fibular neck fracture, left      Plan:   Plan is for ORIF tibia with IM nail later today  NPO until surgery is completed  Non weightbearing LLE  Medication for pain, via IV  Anticipate aspirin for DVT prophylaxis           Chief Complaint:   Fracture of the fibula and tibia, left         History of Present Illness:   This patient is a 34 year old female who presents with the following condition requiring a hospital admission:  displaced tibia and fibular neck fractures, left    Patient was on a walk, carrying her daughter, when she slipped and fell on the ice yesterday.   She presented to the ED, via EMS.  Patient lives independently and does not require an assistive device.    She cares for an 18 month old daughter.  She reports no prior issues with the left lower extremity.              Past Medical History:     Past Medical History:   Diagnosis Date    Anxiety     Depression     Heart murmur     Ovarian cyst rupture              Past Surgical History:     Past Surgical History:   Procedure Laterality Date    EYE SURGERY      tonsilectomy               Social History:     Social History     Tobacco Use    Smoking status: Former    Smokeless tobacco: Not on file   Substance Use Topics    Alcohol use: Yes             Family History:   No pertinent family history          Immunizations:     VACCINE/DOSE   Diptheria   DPT   DTAP   HBIG   Hepatitis A   Hepatitis B   HIB   Influenza   Measles   Meningococcal   MMR   Mumps   Pneumococcal   Polio   Rubella   Small Pox    TDAP   Varicella   Zoster             Allergies:     Allergies   Allergen Reactions    Azithromycin              Medications:     Current Facility-Administered Medications   Medication Dose Route Frequency Provider Last Rate Last Admin    [Auto Hold] acetaminophen (TYLENOL) tablet 650 mg  650 mg Oral Q4H PRAlonzo Celis MD   650 mg at 02/05/25 2205    Or    [Auto Hold] acetaminophen (TYLENOL) Suppository 650 mg  650 mg Rectal Q4H PRAlonzo Celis MD        [Auto Hold] HYDROmorphone (DILAUDID) injection 0.6 mg  0.6 mg Intravenous Q2H PRAlonzo Celis MD   0.6 mg at 02/06/25 0749    [Auto Hold] HYDROmorphone (DILAUDID) tablet 2 mg  2 mg Oral Q4H PRAlonzo Celis MD   2 mg at 02/06/25 0400    [Auto Hold] melatonin tablet 5 mg  5 mg Oral At Bedtime PRAlonzo Celis MD        [Auto Hold] nicotine (COMMIT) lozenge 2 mg  2 mg Buccal Q1H PRAlonzo Celis MD        [Auto Hold] ondansetron (ZOFRAN ODT) ODT tab 4 mg  4 mg Oral Q6H PRAlonzo Celis MD        Or    [Auto Hold] ondansetron (ZOFRAN) injection 4 mg  4 mg Intravenous Q6H PRAlozno Celis MD   4 mg at 02/06/25 0751    [Auto Hold] prochlorperazine (COMPAZINE) injection 10 mg  10 mg Intravenous Q6H PRAlonzo Celis MD        Or    [Auto Hold] prochlorperazine (COMPAZINE) tablet 10 mg  10 mg Oral Q6H PRAolnzo Celis MD        [Auto Hold] senna-docusate (SENOKOT-S/PERICOLACE) 8.6-50 MG per tablet 1 tablet  1 tablet Oral BID Alonzo Villar MD        Or    [Auto Hold] senna-docusate (SENOKOT-S/PERICOLACE) 8.6-50 MG per tablet 2 tablet  2 tablet Oral BID Alonzo Villar MD                 Review of Systems:   ROS:  10 point ROS neg other than the symptoms noted above in the HPI.            Physical Exam:   All vitals have been reviewed  Patient Vitals for the past 24 hrs:   BP  "Temp Temp src Pulse Resp SpO2 Height Weight   02/06/25 0737 -- -- -- -- -- 99 % -- --   02/06/25 0731 -- -- -- -- -- 100 % -- --   02/06/25 0730 -- -- -- -- -- 99 % -- --   02/06/25 0629 -- -- -- -- -- 99 % -- --   02/06/25 0624 -- -- -- -- -- 100 % -- --   02/06/25 0619 -- -- -- -- -- 99 % -- --   02/06/25 0614 -- -- -- -- -- 99 % -- --   02/06/25 0609 -- -- -- -- -- 99 % -- --   02/06/25 0604 -- -- -- -- -- 98 % -- --   02/06/25 0559 -- -- -- -- -- 98 % -- --   02/06/25 0554 -- -- -- -- -- 98 % -- --   02/06/25 0524 -- -- -- -- -- 98 % -- --   02/06/25 0523 -- -- -- -- -- 98 % -- --   02/06/25 0522 -- -- -- -- -- 97 % -- --   02/06/25 0521 -- -- -- -- -- 98 % -- --   02/06/25 0446 -- -- -- -- -- 97 % -- --   02/06/25 0445 -- -- -- -- -- 96 % -- --   02/06/25 0444 -- -- -- -- -- 96 % -- --   02/06/25 0443 -- -- -- -- -- 98 % -- --   02/06/25 0442 -- -- -- -- -- 98 % -- --   02/06/25 0441 -- -- -- -- -- 98 % -- --   02/06/25 0440 -- -- -- -- -- 96 % -- --   02/05/25 2115 123/60 -- -- 82 -- 97 % -- --   02/05/25 2110 -- -- -- -- -- 96 % -- --   02/05/25 2105 -- -- -- -- -- 99 % -- --   02/05/25 2100 (!) 140/84 -- -- 95 -- 97 % -- --   02/05/25 2045 126/70 -- -- -- -- 99 % -- --   02/05/25 1713 -- -- -- -- -- 98 % -- --   02/05/25 1708 107/69 98.7  F (37.1  C) Temporal 77 20 (!) 88 % 1.6 m (5' 3\") 68.7 kg (151 lb 7.3 oz)           Physical Exam   Temp: 98.7  F (37.1  C) Temp src: Temporal BP: 123/60 Pulse: 82   Resp: 20 SpO2: 99 % O2 Device: Nasal cannula Oxygen Delivery: 2 LPM  Vital Signs with Ranges  Temp:  [98.7  F (37.1  C)] 98.7  F (37.1  C)  Pulse:  [77-95] 82  Resp:  [20] 20  BP: (107-140)/(60-84) 123/60  SpO2:  [88 %-100 %] 99 %  151 lbs 7.3 oz    Constitutional: Pleasant, alert, appropriate, following commands.  HEENT: Head atraumatic normocephalic.   Respiratory: Unlabored breathing no audible wheeze  Cardiovascular: Regular rate and rhythm per pulses  GI: Abdomen non-distended.  Lymph/Hematologic: No " lymphadenopathy in areas examined  Genitourinary:  No ryan  Skin: No rashes, no cyanosis, no edema.  Musculoskeletal: Left leg is elevated on pillows, in a long leg splint.  Her hip is externally rotated.  She can wiggle the toes slightly.  Neurovascular exam is intact.  No motion of the hip performed.  Neurologic: normal without focal findings, mental status, speech normal, alert and oriented x iii              Data:   All laboratory data reviewed  Results for orders placed or performed during the hospital encounter of 02/05/25   XR Tibia and Fibula Left 2 Views     Status: None    Narrative    EXAM: XR TIBIA AND FIBULA LEFT 2 VIEWS  LOCATION: [facility]  DATE: 2/5/2025    INDICATION: fall  COMPARISON: None.      Impression    IMPRESSION: Mildly displaced obliquely oriented distal left tibial shaft and fibular neck fractures. Joint spaces are preserved.   Basic metabolic panel     Status: Normal   Result Value Ref Range    Sodium 138 135 - 145 mmol/L    Potassium 4.1 3.4 - 5.3 mmol/L    Chloride 101 98 - 107 mmol/L    Carbon Dioxide (CO2) 24 22 - 29 mmol/L    Anion Gap 13 7 - 15 mmol/L    Urea Nitrogen 11.5 6.0 - 20.0 mg/dL    Creatinine 0.61 0.51 - 0.95 mg/dL    GFR Estimate >90 >60 mL/min/1.73m2    Calcium 8.8 8.8 - 10.4 mg/dL    Glucose 96 70 - 99 mg/dL   CBC with platelets and differential     Status: None   Result Value Ref Range    WBC Count 9.1 4.0 - 11.0 10e3/uL    RBC Count 4.72 3.80 - 5.20 10e6/uL    Hemoglobin 14.3 11.7 - 15.7 g/dL    Hematocrit 42.7 35.0 - 47.0 %    MCV 91 78 - 100 fL    MCH 30.3 26.5 - 33.0 pg    MCHC 33.5 31.5 - 36.5 g/dL    RDW 11.7 10.0 - 15.0 %    Platelet Count 197 150 - 450 10e3/uL    % Neutrophils 67 %    % Lymphocytes 20 %    % Monocytes 12 %    % Eosinophils 1 %    % Basophils 0 %    % Immature Granulocytes 0 %    NRBCs per 100 WBC 0 <1 /100    Absolute Neutrophils 6.0 1.6 - 8.3 10e3/uL    Absolute Lymphocytes 1.8 0.8 - 5.3 10e3/uL    Absolute Monocytes  1.1 0.0 - 1.3 10e3/uL    Absolute Eosinophils 0.1 0.0 - 0.7 10e3/uL    Absolute Basophils 0.0 0.0 - 0.2 10e3/uL    Absolute Immature Granulocytes 0.0 <=0.4 10e3/uL    Absolute NRBCs 0.0 10e3/uL   HCG quantitative pregnancy     Status: Normal   Result Value Ref Range    hCG Quantitative <1 <5 mIU/mL   Adult Type and Screen     Status: None   Result Value Ref Range    ABO/RH(D) A POS     Antibody Screen Negative Negative    SPECIMEN EXPIRATION DATE [id number]    ABO/Rh type and screen     Status: None    Narrative    The following orders were created for panel order ABO/Rh type and screen.  Procedure                               Abnormality         Status                     ---------                               -----------         ------                     Adult Type and Screen[[id number]]                            Final result                 Please view results for these tests on the individual orders.   CBC with Platelets & Differential     Status: None    Narrative    The following orders were created for panel order CBC with Platelets & Differential.  Procedure                               Abnormality         Status                     ---------                               -----------         ------                     CBC with platelets and d...[[id number]]                      Final result                 Please view results for these tests on the individual orders.          Attestation:  I have reviewed today's vital signs, notes, medications, labs and imaging with Dr. Chase Luke.  Amount of time performed on this consult: 45 minutes.    Katelin Flores PA-C

## 2025-02-06 NOTE — OP NOTE
ORTHOPEDIC OPERATIVE REPORT    Patient Name:  Bindu Mcleod 34 year old female  :  1990  MR Number:   [id number]  CSN Number:   [id number]    Date of Service: 2025 11:50 AM    Surgeon:  Chase Luke DO    Assistant(s):  Adriana Del Cid PA-C    OR Staff:  Circulator: Sharri Chacko RN  Relief Circulator: Crao Zheng RN  Relief Scrub: Alma Rosa Becerril  Scrub Person: Cara Pineda    Preoperative Diagnosis:  Left midshaft tibia fracture  Left fibular neck fracture     Postoperative Diagnosis:  Same as above    Procedure(s):  Intramedullary nail fixation left tibia fracture  Nonoperative care left fibular neck fracture without manipulation    Anesthesia Type:  General, with quarter percent Marcaine plain local anesthetic, 30 cc    Estimated Blood Loss: 75 cc    Specimens: none    Drain(s), Tube(s), Packing: none    Complications:  none    Implants:   Implant Name Type Inv. Item Serial No.  Lot No. LRB No. Used Action   NAIL 9MM 330MM TIB TN ADV IM MR CONDITIONAL TI 04.043.130S - [id number] [employer] [facility] NAIL 9MM 330MM TIB TN ADV IM MR CONDITIONAL TI 04.043.130S  [employer] [id number] Left 1 Implanted   SCREW BN 32MM 5MM LCK X25 STRL IM NL 04.045.032TS - [id number] [employer] [facility] SCREW BN 32MM 5MM LCK X25 STRL IM NL 04.045.032TS  [employer] [id number] Left 1 Implanted   SCREW BN 34MM 5MM LCK X25 STRL LF IM NL SYS 04.045.034TS - [id number] [employer] [facility] SCREW BN 34MM 5MM LCK X25 STRL LF IM NL SYS 04.045.034TS  [employer] [id number] Left 1 Implanted   SCREW BN 34MM 5MM LCK X25 STRL LF IM NL SYS 04.045.034TS - [id number] [employer] SCREW BN 34MM 5MM LCK X25 STRL LF IM NL SYS 04.045.034TS  [employer] [id number] Left 1 Implanted   LOW PROF LOCKNG SCREW F/IM NAIL [id number] [employer]   [employer]ES [id number] Left 1 Implanted              Indications:  The patient is a 34 year old female who initially presented after a  fall on ice yesterday 2/5/2025.  She was diagnosed with a left tib-fib fracture, splinted and admitted.  Patient is accompanied by her fiancé, she has an 18-month old child at home.  Her pain has been controlled since admission, ice and elevation, her swelling is actually relatively mild.  I discussed with her treatment options this afternoon and recommended surgical fixation of her unstable long bone fracture.  We discussed what this would entail, risks benefits and alternatives, see below.  See preoperative consultation for additional information and discussion.  Generalized postoperative course and rehab were also reviewed with them questions were answered and we proceeded to the OR.    Risks, benefits, alternatives, and anticipated postoperative course were discussed. Risks of surgery include, but are not limited to, bleeding, infection, wound healing complications, neurovascular injury, pain, stiffness, arthritis, nonunion, malunion, instability, limb length discrepancy, symptomatic hardware, thromboembolic phenomena, heart attack, stroke, anesthetic and medical complications, and death. Benefits of surgery were discussed including improved chance of union in acceptable alignment, early mobilization. The patient is in agreement with the plan to proceed, voiced their understanding, and informed consent was signed.    Procedure in Detail:  The patient was greeted in the preoperative holding area, identified as the correct patient, and the informed consent was reviewed. The operative extremity was marked, and they were taken back to the operating room. General anesthesia was administered and she was transferred to the operating table in the supine position all bony prominences were well-padded.  A bone foam was placed, and a bump under the ipsilateral buttock.  1015 drape was placed and the splint was taken down, swelling was quite reasonable, though with some mild developing bruising along the central third of  the tibia, expected.  The entire extremity is scrubbed with alcohol and dried.  No tourniquet was utilized.  The left leg was then prepped and draped in usual sterile fashion.  A procedural pause was conducted to verify correct patient, correct extremity, presence of the surgeons initials on the operative extremity, and administration of IV antibiotics, 2 g Ancef. Following generalized agreement, we proceeded by assessing mobility of her patella, I did use a guidepin overlying the proximal tibia to see if a parapatellar approach would be feasible.  It did seem as though there would be a fair amount of pressure on the soft tissues and I abandon that approach.  A standard suprapatellar incision was made and dissection carried down to the quadriceps tendon which was split in line with its fibers.  Blunt dissection carried down into the suprapatellar pouch.  The protective sleeve with cannula used and advanced to the proximal tibia, guidepin at the appropriate start point driven into the proximal tibia under biplanar imaging.  The proximal tibia was then overreamed and instruments withdrawn, cannula remained.  Attention was then turned to the fracture site.  Fluoroscopy was used to latosha out 2 small stab incisions which were made anteromedially proximally and posterolaterally distally.  Blunt dissection with a hemostat, and a large [place] clamp was used to anatomically reduce her oblique tibia fracture.  This was held manually, very carefully during reaming.  A ball-tipped guidewire was then advanced to the center center position of the ankle and the nail was then measured.  Reaming commenced the size 8.5 end-cutting reamer up to a size 10.5 to accommodate a size 9 x 330 nail.  The nail was then passed over the guidewire although reduction was lost at 1 point due to impaction of the nail.  This had to be rereduced and then the nail was passed into the distal tibia to the appropriate depth.  The jig was assembled  proximally and 2 interlocking screws were placed through small stab incisions and cannulas.  Attention was turned distally and 2 medial to lateral interlocking screws were placed in standard perfect [place] fashion, these were of the headless low-profile variety.  The proximal screw had excellent bite and was not seated all the way down into the near cortex to avoid hardware complication or screw breakage.  Clamp was removed, jig was disassembled.  Final fluoroscopic imaging demonstrated good fracture reduction implant placement and screw lengths.  The fibular neck fracture was also reevaluated under fluoroscopy which was noted to be quite well reduced after the tibia was fixed, this will be treated nonoperatively without manipulation in a walking boot.  All wounds were copiously irrigated including the knee joint with sterile saline.  The knee joint was suctioned.  Quadriceps tendon closed with interrupted #1 Vicryl, 2-0 Vicryl for subcutaneous layers of all incisions and 2-0 nylon for the skin.  Skin was cleansed and dried.  30 cc of quarter percent Marcaine plain were used in and around the knee as well as the fracture site for a hematoma block essentially and around the distal interlocking screws.  Sterile dressings were then applied consisting of Xeroform 4 x 4's ABD pad soft cast padding and an Ace wrap, followed by cam walker boot.  The patient was then turned over to anesthesia for transfer to PACU and extubation.    Adriana Del Cid PA-C was needed for and participated in all the aspects of the case, including preoperative preparation and positioning of the patient, intraoperative manipulation of the limb, retraction of soft tissues, protection of vital structures, suctioning of bodily fluids and wound closure as well as dressing application.    Postoperative Plan:  Activity / weight bearing: WBAT LLE, ice, elevate, may adjust or loosen boot comfort, PT/OT, gait aids  Antibiotics: perioperative ancef  2g  Anticoagulation VTE ppx: mechanical SCDs, pharmacologic aspirin 81mg BID 6wks  Analgesia: multimodal regimen, though will check with patient regarding possible breastfeeding prior to making final decisions on vte ppx and pain meds  Discharge planning: pending progress with PT and pain control  Follow up: 2 weeks in office for incision check, likely suture removal, and full-length tib-fib radiographs      Chase Luke DO MSc  2/6/2025  3:48 PM

## 2025-02-06 NOTE — PROGRESS NOTES
"Hospitalist Medicine Progress Note   [facility]       Bindu Mcleod is a 34 year old lady with history of depression, generalized anxiety disorder came to [facility] 2/5/2025 for  ground-level fall with left leg pain with x-ray of the left tibia and fibula showing Mildly displaced obliquely oriented distal left tibial shaft and fibular neck fractures for which orthopedic surgery was consulted and patient underwent intramedullary nail fixation of the left tibial fracture and nonoperative care of the left fibular neck fracture without manipulation by Dr. Chase Luke DO on 2/6/2025       Date of Admission:  2/5/2025  Assessment & Plan     Displaced Left Tibial Shaft & Fibular Neck Fractures  Mechanical Ground-Level Fall  -Presents after a ground-level fall while slipping on the ice  -XR with mildly displaced obliquely oriented distal left tibial shaft and fibular neck fractures  -Pain regimen -patient is breast-feeding and wants pain medication that this is safer during breast-feeding I will order ibuprofen which is one of the safest during breast-feeding and will like to avoid oxycodone  -We will give PPI with ibuprofen     MDD & CHARISMA  -Not on any prescription medications for these issues            Plan:   Ibuprofen 400 mg 4 times daily as needed  Protonix 40 mg daily    Diet:  Regular diet  DVT Prophylaxis: As per orthopedic service  Cunningham Catheter: Not present  Code Status: Full Code         Medically Ready for Discharge: Anticipated in 2-4 Days    Clinically Significant Risk Factors Present on Admission                             # Overweight: Estimated body mass index is 26.83 kg/m  as calculated from the following:    Height as of this encounter: 1.6 m (5' 3\").    Weight as of this encounter: 68.7 kg (151 lb 7.3 oz).                    The patient's care was discussed with the Patient and RN.    Valerio Joyce MD  Hospitalist Service  [facility]    ______________________________________________________________________    Interval History     Symptoms   Patient's feels that the pain is less after surgery then as compared to before  Patient not complaining of nausea    Review of Systems:   She says she is breast-feeding and wants pain medication that is tolerable to the child    Data reviewed today: I reviewed all medications, new labs and imaging results over the last 24 hours.     Physical Exam   Vital Signs: Temp: (!) 96.6  F (35.9  C) Temp src: Temporal BP: 95/51 Pulse: 87   Resp: 22 SpO2: 99 % O2 Device: Simple face mask Oxygen Delivery: 6 LPM  Weight: 151 lbs 7.3 oz      GENERAL: Patient is not in acute distress  HEENT: EOM+,Conjunctiva is clear   NECK:  no Jugular Venous distention  HEART: S1 S2 regular Rate and Rhythm, there is  no murmur,   LUNGS: Respirations are  not laboured, Lungs are  clear to auscultation without Crepitations or Wheezing   ABDOMEN: Soft , there is no tenderness , Bowel Sounds are  Positive   LOWER LIMBS: Left lower incision site was not seen  CNS:  Alert,  Oriented x 3, Moving all the Four Limbs     Data   Recent Labs   Lab 02/06/25  0801   WBC 9.1   HGB 14.3   MCV 91         POTASSIUM 4.1   CHLORIDE 101   CO2 24   BUN 11.5   CR 0.61   ANIONGAP 13   WISAM 8.8   GLC 96         Recent Results (from the past 24 hours)   XR Tibia and Fibula Left 2 Views    Narrative    EXAM: XR TIBIA AND FIBULA LEFT 2 VIEWS  LOCATION: [facility]  DATE: 2/5/2025    INDICATION: fall  COMPARISON: None.      Impression    IMPRESSION: Mildly displaced obliquely oriented distal left tibial shaft and fibular neck fractures. Joint spaces are preserved.   XR Surgery FRANCIS L/T 5 Min Fluoro w Stills    Narrative    This exam was marked as non-reportable because it will not be read by a   radiologist or a [place] non-radiologist provider.

## 2025-02-06 NOTE — CONSULTS
[facility]    ORTHOPEDIC CONSULTATION NOTE     Patient: Bindu Mcleod 34 year oldfemale   Admit Date: 2/5/2025          Today's Date: 2/6/2025  Attending: Alonzo Kong  The patient is a 34 year old female who initially presented to the ER yesterday 2/5/2025 after a fall on ice.  Immediate pain with deformity to the left lower extremity.  Radiographs in the ER with a spiral displaced midshaft tib-fib fracture.  She was subsequently admitted for further workup and surgical planning.  She does have an 18-month at home, present with her fiancé Veto as well ([phone]).  She states that her pain is somewhere around 5 or 6 out of 10 currently although with any motion this does become rather severe.  The leg is splinted and elevated on several pillows.  She denies any numbness or tingling.  Due to the demanding OR schedule attempts were originally made for transfer the patient to [facility] today for surgical intervention to ensure she was taking care of although openings presented themselves at [facility] and she has remained here.  No prior fractures to note, her medical and surgical history reviewed noncontributory, she does not smoke.    Medical History  Past Medical History:   Diagnosis Date    Anxiety     Depression     Heart murmur     Ovarian cyst rupture        Surgical History  Past Surgical History:   Procedure Laterality Date    EYE SURGERY      tonsilectomy         Medications  Current Facility-Administered Medications   Medication Dose Route Frequency Provider Last Rate Last Admin    [Auto Hold] acetaminophen (TYLENOL) tablet 650 mg  650 mg Oral Q4H PRN Alonzo Kong MD   650 mg at 02/05/25 2205    Or    [Auto Hold] acetaminophen (TYLENOL) Suppository 650 mg  650 mg Rectal Q4H PRN Alonzo Kong MD        ceFAZolin Sodium (ANCEF) injection 2 g  2 g Intravenous Pre-Op/Pre-procedure x 1 dose Adriana Del Cid PA-C         ceFAZolin Sodium (ANCEF) injection 2 g  2 g Intravenous See Admin Instructions Adriana Del Cid PA-C        [Auto Hold] HYDROmorphone (DILAUDID) injection 0.6 mg  0.6 mg Intravenous Q2H PRN Alonzo Kong MD   0.6 mg at 02/06/25 0749    [Auto Hold] HYDROmorphone (DILAUDID) tablet 2 mg  2 mg Oral Q4H PRN Alonzo Kong MD   2 mg at 02/06/25 0400    lactated ringers infusion   Intravenous Continuous Ishaan Squires MD 10 mL/hr at 02/06/25 1014 New Bag at 02/06/25 1014    [Auto Hold] melatonin tablet 5 mg  5 mg Oral At Bedtime PRN Alonzo Kong MD        [Auto Hold] nicotine (COMMIT) lozenge 2 mg  2 mg Buccal Q1H PRN Alonzo Kong MD        [Auto Hold] ondansetron (ZOFRAN ODT) ODT tab 4 mg  4 mg Oral Q6H PRN Alonzo Kong MD        Or    [Auto Hold] ondansetron (ZOFRAN) injection 4 mg  4 mg Intravenous Q6H PRN Alonzo Kong MD   4 mg at 02/06/25 0751    [Auto Hold] prochlorperazine (COMPAZINE) injection 10 mg  10 mg Intravenous Q6H PRN Alonzo Kong MD        Or    [Auto Hold] prochlorperazine (COMPAZINE) tablet 10 mg  10 mg Oral Q6H PRN Alonzo Kong MD        [Auto Hold] senna-docusate (SENOKOT-S/PERICOLACE) 8.6-50 MG per tablet 1 tablet  1 tablet Oral BID PRN Alonzo Kong MD        Or    [Auto Hold] senna-docusate (SENOKOT-S/PERICOLACE) 8.6-50 MG per tablet 2 tablet  2 tablet Oral BID PRN Alonzo Kong MD        tranexamic acid 1 g in 100 mL NS IV bag (premix)  1 g Intravenous Once Maria Eugenia, Adriana, PA-C           Allergies  Allergies   Allergen Reactions    Azithromycin         Family History  History reviewed. No pertinent family history.    Social History  Social History     Socioeconomic History    Marital status: Single     Spouse name: None    Number of children: None    Years of education: None    Highest education level: None   Tobacco Use    Smoking status:  "Former   Substance and Sexual Activity    Alcohol use: Yes    Drug use: No     Social Drivers of Health     Interpersonal Safety: Low Risk  (2/6/2025)    Interpersonal Safety     Do you feel physically and emotionally safe where you currently live?: Yes     Within the past 12 months, have you been hit, slapped, kicked or otherwise physically hurt by someone?: No     Within the past 12 months, have you been humiliated or emotionally abused in other ways by your partner or ex-partner?: No       ROS  Left lower extremity pain. All other systems were reviewed and found to be negative    Physical Exam  BP 93/56   Pulse 61   Temp 98.6  F (37  C)   Resp 16   Ht 1.6 m (5' 3\")   Wt 68.7 kg (151 lb 7.3 oz)   LMP  (LMP Unknown)   SpO2 99%   Breastfeeding Yes   BMI 26.83 kg/m    General: appears to be in no acute distress  Psychiatric: alert & oriented x3, appropriate responses to questions, pleasant mood and affect  HEENT: normocephalic, atraumatic, EOMI  Neck: supple  Chest: breathing unlabored  Heart: palpable peripheral pulses  Left lower extremity: Tender in the tibial region, leg is splinted, she is able to wiggle all toes and there is no pain with passive stretch, she denies any numbness or tingling or obvious altered sensation about the toes of the foot, though the toes are cool they are equal to the contralateral side, she is little bit cold right now.  Accessible compartments appear soft and compressible, will further examine after induction of anesthesia and splint removal, otherwise appears neurologically intact    Imaging  Radiographs of the left tibia and fibula taken yesterday demonstrate spiral/oblique midshaft tibia fracture with an associated fibular neck fracture approximately.    Laboratory Values  Lab Results   Component Value Date    WBC 9.1 02/06/2025    HGB 14.3 02/06/2025    HCT 42.7 02/06/2025    MCV 91 02/06/2025     02/06/2025     Lab Results   Component Value Date     " 02/06/2025    CO2 24 02/06/2025    BUN 11.5 02/06/2025         PROBLEMS:     Active Problems:    Tibia/fibula fracture, left, closed, initial encounter      ASSESSMENT AND PLAN:      34 year old female fall yesterday on ice 2/5/2025  -Left spiral tib-fib fracture  -Bindu was admitted overnight for surgical planning today.  She is accompanied by her fiancé Veto.  I discussed treatment options with her and recommended surgery for her displaced long bone fracture.  I discussed what this would entail as well as risks benefits and alternatives, see below.  She would like to get home soon as possible as she does have a young 1 at home, 18 months.  I reviewed with her what surgery would entail as far as intramedullary nail fixation of her tibia fracture with likely boot application postoperatively, physical therapy for gait aids and safe mobilization prior to discharge.  We also discussed the possibility of compartment syndrome given her long bone fracture although she has no signs or symptoms of this currently, and pain has been controlled overnight since injury and admission.  -Labs reviewed, hemoglobin 14.3, white blood cell count 9.1, platelet count 197.  Electrolytes reviewed within normal limits, type and screen done.  hCG done negative.  -Bindu and her fiancé's questions were answered, in agreement to proceed, consent signed, extremity marked  -Admission: inpatient  -Antibiotics: periop  -Analgesia: multimodal regimen,   -Activity/WB: NWB, bedrest for OR, PT/OT postoperatively  -Anticoagulation: SCDs, pharm held for OR, Lovenox versus aspirin postoperatively  -Diet: N.p.o.  -Disposition: pending pain control and safe mobilization with PT postop, questions answered  -I recommended operative treatment of her left tibia fracture with intramedullary nail fixation today. Risks, benefits, alternatives, and anticipated postoperative course were discussed. Risks of surgery include, but are not limited to, bleeding,  infection, wound healing complications, neurovascular injury, pain, stiffness, arthritis, nonunion, malunion, instability, limb length discrepancy, symptomatic hardware, thromboembolic phenomena, heart attack, stroke, anesthetic and medical complications, and death. Benefits of surgery were discussed including improved chance of union in acceptable alignment, early mobilization. We also discussed therapeutic alternatives to surgery, including non-operative management, which I did not recommend. They understand these risks and benefits and wish to proceed. Keep NPO pending surgery. Please see operative note for detailed post-operative plan, including post-op weightbearing status.     I have explained in a language understandable to the patient or substitute decision maker:    - The procedure the patient is having and why they need it  - The possible risks of the procedure  - Reasonable alternatives to this procedure, the relevant risks, benefits, and uncertainties  - The significant risks and problems specific to this patient     I have given the patient/substitute decision-maker an opportunity to:  - Ask questions about any of the above information  - Raise any other concerns     I believe the patient/substitute decision-maker understood the above information    Chase Luke DO, MSc  2/6/2025 1:12 PM

## 2025-02-07 ENCOUNTER — APPOINTMENT (OUTPATIENT)
Dept: OCCUPATIONAL THERAPY | Facility: CLINIC | Age: 35
End: 2025-02-07
Payer: COMMERCIAL

## 2025-02-07 LAB
GLUCOSE SERPL-MCNC: 103 MG/DL (ref 70–99)
HGB BLD-MCNC: 12.2 G/DL (ref 11.7–15.7)

## 2025-02-07 PROCEDURE — 97530 THERAPEUTIC ACTIVITIES: CPT | Mod: GO | Performed by: OCCUPATIONAL THERAPIST

## 2025-02-07 PROCEDURE — 85018 HEMOGLOBIN: CPT

## 2025-02-07 PROCEDURE — 99232 SBSQ HOSP IP/OBS MODERATE 35: CPT | Performed by: INTERNAL MEDICINE

## 2025-02-07 PROCEDURE — 250N000013 HC RX MED GY IP 250 OP 250 PS 637: Performed by: INTERNAL MEDICINE

## 2025-02-07 PROCEDURE — 250N000013 HC RX MED GY IP 250 OP 250 PS 637

## 2025-02-07 PROCEDURE — 120N000001 HC R&B MED SURG/OB

## 2025-02-07 PROCEDURE — 36415 COLL VENOUS BLD VENIPUNCTURE: CPT

## 2025-02-07 PROCEDURE — 250N000011 HC RX IP 250 OP 636: Mod: JZ | Performed by: INTERNAL MEDICINE

## 2025-02-07 PROCEDURE — 82947 ASSAY GLUCOSE BLOOD QUANT: CPT | Performed by: INTERNAL MEDICINE

## 2025-02-07 PROCEDURE — 250N000011 HC RX IP 250 OP 636

## 2025-02-07 PROCEDURE — 97166 OT EVAL MOD COMPLEX 45 MIN: CPT | Mod: GO | Performed by: OCCUPATIONAL THERAPIST

## 2025-02-07 RX ORDER — ACETAMINOPHEN 325 MG/1
650 TABLET ORAL EVERY 6 HOURS
Qty: 100 TABLET | Refills: 0 | Status: SHIPPED | OUTPATIENT
Start: 2025-02-07 | End: 2025-02-11

## 2025-02-07 RX ORDER — CYCLOBENZAPRINE HCL 5 MG
10 TABLET ORAL 3 TIMES DAILY
Status: DISCONTINUED | OUTPATIENT
Start: 2025-02-07 | End: 2025-02-08

## 2025-02-07 RX ORDER — NALOXONE HYDROCHLORIDE 0.4 MG/ML
0.4 INJECTION, SOLUTION INTRAMUSCULAR; INTRAVENOUS; SUBCUTANEOUS
Status: DISCONTINUED | OUTPATIENT
Start: 2025-02-07 | End: 2025-02-11 | Stop reason: HOSPADM

## 2025-02-07 RX ORDER — HYDROMORPHONE HYDROCHLORIDE 2 MG/1
4 TABLET ORAL EVERY 4 HOURS PRN
Status: DISCONTINUED | OUTPATIENT
Start: 2025-02-07 | End: 2025-02-08 | Stop reason: DRUGHIGH

## 2025-02-07 RX ORDER — HYDROMORPHONE HCL IN WATER/PF 6 MG/30 ML
0.6 PATIENT CONTROLLED ANALGESIA SYRINGE INTRAVENOUS
Status: DISCONTINUED | OUTPATIENT
Start: 2025-02-07 | End: 2025-02-10

## 2025-02-07 RX ORDER — IBUPROFEN 400 MG/1
400 TABLET, FILM COATED ORAL EVERY 6 HOURS PRN
Qty: 40 TABLET | Refills: 1 | Status: SHIPPED | OUTPATIENT
Start: 2025-02-07 | End: 2025-02-11

## 2025-02-07 RX ORDER — ASPIRIN 81 MG/1
81 TABLET ORAL 2 TIMES DAILY
Qty: 60 TABLET | Refills: 0 | Status: SHIPPED | OUTPATIENT
Start: 2025-02-07

## 2025-02-07 RX ORDER — NALOXONE HYDROCHLORIDE 0.4 MG/ML
0.2 INJECTION, SOLUTION INTRAMUSCULAR; INTRAVENOUS; SUBCUTANEOUS
Status: DISCONTINUED | OUTPATIENT
Start: 2025-02-07 | End: 2025-02-11 | Stop reason: HOSPADM

## 2025-02-07 RX ORDER — IBUPROFEN 600 MG/1
600 TABLET, FILM COATED ORAL EVERY 6 HOURS PRN
Status: DISCONTINUED | OUTPATIENT
Start: 2025-02-07 | End: 2025-02-08

## 2025-02-07 RX ORDER — HYDROMORPHONE HYDROCHLORIDE 2 MG/1
2 TABLET ORAL EVERY 4 HOURS PRN
Qty: 20 TABLET | Refills: 0 | Status: SHIPPED | OUTPATIENT
Start: 2025-02-07 | End: 2025-02-10

## 2025-02-07 RX ORDER — AMOXICILLIN 250 MG
1 CAPSULE ORAL 2 TIMES DAILY PRN
Qty: 20 TABLET | Refills: 1 | Status: SHIPPED | OUTPATIENT
Start: 2025-02-07

## 2025-02-07 RX ADMIN — ACETAMINOPHEN 650 MG: 325 TABLET, FILM COATED ORAL at 06:22

## 2025-02-07 RX ADMIN — HYDROMORPHONE HYDROCHLORIDE 0.6 MG: 0.2 INJECTION, SOLUTION INTRAMUSCULAR; INTRAVENOUS; SUBCUTANEOUS at 02:24

## 2025-02-07 RX ADMIN — HYDROMORPHONE HYDROCHLORIDE 0.6 MG: 0.2 INJECTION, SOLUTION INTRAMUSCULAR; INTRAVENOUS; SUBCUTANEOUS at 12:52

## 2025-02-07 RX ADMIN — IBUPROFEN 600 MG: 600 TABLET, FILM COATED ORAL at 23:24

## 2025-02-07 RX ADMIN — HYDROMORPHONE HYDROCHLORIDE 4 MG: 2 TABLET ORAL at 20:49

## 2025-02-07 RX ADMIN — IBUPROFEN 400 MG: 400 TABLET, FILM COATED ORAL at 08:49

## 2025-02-07 RX ADMIN — HYDROMORPHONE HYDROCHLORIDE 2 MG: 2 TABLET ORAL at 15:27

## 2025-02-07 RX ADMIN — CEFAZOLIN 1 G: 1 INJECTION, POWDER, FOR SOLUTION INTRAMUSCULAR; INTRAVENOUS at 05:16

## 2025-02-07 RX ADMIN — ASPIRIN 81 MG: 81 TABLET, COATED ORAL at 20:49

## 2025-02-07 RX ADMIN — PANTOPRAZOLE SODIUM 40 MG: 40 TABLET, DELAYED RELEASE ORAL at 07:15

## 2025-02-07 RX ADMIN — IBUPROFEN 400 MG: 400 TABLET, FILM COATED ORAL at 15:27

## 2025-02-07 RX ADMIN — ASPIRIN 81 MG: 81 TABLET, COATED ORAL at 07:15

## 2025-02-07 RX ADMIN — ONDANSETRON 4 MG: 2 INJECTION INTRAMUSCULAR; INTRAVENOUS at 02:33

## 2025-02-07 RX ADMIN — IBUPROFEN 400 MG: 400 TABLET, FILM COATED ORAL at 01:46

## 2025-02-07 RX ADMIN — POLYETHYLENE GLYCOL 3350 17 G: 17 POWDER, FOR SOLUTION ORAL at 07:15

## 2025-02-07 RX ADMIN — ACETAMINOPHEN 650 MG: 325 TABLET, FILM COATED ORAL at 08:46

## 2025-02-07 RX ADMIN — ACETAMINOPHEN 650 MG: 325 TABLET, FILM COATED ORAL at 01:46

## 2025-02-07 RX ADMIN — CYCLOBENZAPRINE HYDROCHLORIDE 10 MG: 5 TABLET, FILM COATED ORAL at 17:03

## 2025-02-07 RX ADMIN — HYDROMORPHONE HYDROCHLORIDE 2 MG: 2 TABLET ORAL at 10:00

## 2025-02-07 RX ADMIN — CYCLOBENZAPRINE HYDROCHLORIDE 10 MG: 5 TABLET, FILM COATED ORAL at 20:49

## 2025-02-07 RX ADMIN — HYDROMORPHONE HYDROCHLORIDE 2 MG: 2 TABLET ORAL at 06:21

## 2025-02-07 RX ADMIN — ACETAMINOPHEN 650 MG: 325 TABLET, FILM COATED ORAL at 15:26

## 2025-02-07 ASSESSMENT — ACTIVITIES OF DAILY LIVING (ADL)
ADLS_ACUITY_SCORE: 27
ADLS_ACUITY_SCORE: 23
ADLS_ACUITY_SCORE: 27
ADLS_ACUITY_SCORE: 27
ADLS_ACUITY_SCORE: 23
ADLS_ACUITY_SCORE: 27
ADLS_ACUITY_SCORE: 23
ADLS_ACUITY_SCORE: 27
ADLS_ACUITY_SCORE: 27
ADLS_ACUITY_SCORE: 26
ADLS_ACUITY_SCORE: 27

## 2025-02-07 NOTE — PLAN OF CARE
Goal Outcome Evaluation:      Plan of Care Reviewed With: patient    Overall Patient Progress: improvingOverall Patient Progress: improving     A&Ox4, RA, VSS. Arrived to unit around 1700. Regular diet. Patient refused to get out of bed including sitting at the edge of the bed. Educated on the importance of ambulating for DVT prevention. Pain treated with tylenol and Ibuprofen. Patient would like to stay away from medications that are not breastfeeding safe- patient currently pumping and dumping. Pump at bedside. Bladderscanned for 600- patient was unable to go via purewick- straight cath for 800. Ancef given. Fiance at bedside. Pending discharge plans.    Problem: Adult Inpatient Plan of Care  Goal: Plan of Care Review  Outcome: Progressing  Flowsheets (Taken 2/6/2025 2224)  Plan of Care Reviewed With: patient  Overall Patient Progress: improving  Goal: Patient-Specific Goal (Individualized)  Outcome: Progressing  Goal: Absence of Hospital-Acquired Illness or Injury  Outcome: Progressing  Intervention: Identify and Manage Fall Risk  Recent Flowsheet Documentation  Taken 2/6/2025 1730 by Suki Gacría RN  Safety Promotion/Fall Prevention:   activity supervised   assistive device/personal items within reach   safety round/check completed   room organization consistent   room near nurse's station  Intervention: Prevent Skin Injury  Recent Flowsheet Documentation  Taken 2/6/2025 1730 by Suki García RN  Body Position: position maintained  Intervention: Prevent Infection  Recent Flowsheet Documentation  Taken 2/6/2025 1730 by Suki García, RN  Infection Prevention:   hand hygiene promoted   rest/sleep promoted  Goal: Optimal Comfort and Wellbeing  Outcome: Progressing  Intervention: Monitor Pain and Promote Comfort  Recent Flowsheet Documentation  Taken 2/6/2025 2034 by Suki García RN  Pain Management Interventions:   cold applied   repositioned  Taken 2/6/2025 1948 by Suki García, RN  Pain Management  Interventions: medication (see MAR)  Goal: Readiness for Transition of Care  Outcome: Progressing  Intervention: Mutually Develop Transition Plan  Recent Flowsheet Documentation  Taken 2/6/2025 1900 by Suki García RN  Equipment Currently Used at Home: none     Problem: Surgery Nonspecified  Goal: Absence of Bleeding  Outcome: Progressing  Goal: Effective Bowel Elimination  Outcome: Progressing  Goal: Fluid and Electrolyte Balance  Outcome: Progressing  Goal: Blood Glucose Level Within Targeted Range  Outcome: Progressing  Goal: Absence of Infection Signs and Symptoms  Outcome: Progressing  Goal: Anesthesia/Sedation Recovery  Outcome: Progressing  Intervention: Optimize Anesthesia Recovery  Recent Flowsheet Documentation  Taken 2/6/2025 1730 by Suki García RN  Safety Promotion/Fall Prevention:   activity supervised   assistive device/personal items within reach   safety round/check completed   room organization consistent   room near nurse's station  Goal: Optimal Pain Control and Function  Outcome: Progressing  Intervention: Prevent or Manage Pain  Recent Flowsheet Documentation  Taken 2/6/2025 2034 by Suki García RN  Pain Management Interventions:   cold applied   repositioned  Taken 2/6/2025 1948 by Suki García RN  Pain Management Interventions: medication (see MAR)  Goal: Nausea and Vomiting Relief  Outcome: Progressing  Intervention: Prevent or Manage Nausea and Vomiting  Recent Flowsheet Documentation  Taken 2/6/2025 1730 by Suki García RN  Nausea/Vomiting Interventions: (denies) other (see comments)  Goal: Effective Urinary Elimination  Outcome: Progressing  Goal: Effective Oxygenation and Ventilation  Outcome: Progressing  Intervention: Optimize Oxygenation and Ventilation  Recent Flowsheet Documentation  Taken 2/6/2025 1730 by Suki García RN  Activity Management:   activity encouraged   activity adjusted per tolerance

## 2025-02-07 NOTE — PROGRESS NOTES
Hospitalist Medicine Progress Note   [facility]       Bindu Mcleod is a 34 year old lady with history of depression, generalized anxiety disorder came to [facility] 2/5/2025 for  ground-level fall with left leg pain with x-ray of the left tibia and fibula showing Mildly displaced obliquely oriented distal left tibial shaft and fibular neck fractures for which orthopedic surgery was consulted and patient underwent intramedullary nail fixation of the left tibial fracture and nonoperative care of the left fibular neck fracture without manipulation by Dr. Chase Luke, DO on 2/6/2025       Date of Admission:  2/5/2025  Assessment & Plan     Displaced Left Tibial Shaft & Fibular Neck Fractures  Mechanical Ground-Level Fall  -Presents after a ground-level fall while slipping on the ice  -XR with mildly displaced obliquely oriented distal left tibial shaft and fibular neck fractures  -Pain regimen -patient is breast-feeding and wants pain medication that this is safer during breast-feeding I will order ibuprofen which is one of the safest during breast-feeding and will like to avoid oxycodone  -I will increase the frequency of Dilaudid to every 4 hours from every 6 hours  -Add Flexeril  -Increase the dose of ibuprofen to 600 mg 4 times daily  -We will give PPI with ibuprofen     MDD & CHARISMA  -Not on any prescription medications for these issues            Plan:   Ibuprofen 600 mg 4 times daily as needed  Flexeril will be added  Increase the frequency of oral Dilaudid to every 4 hours from every 6 hours  Protonix 40 mg daily    Diet: Advance Diet as Tolerated: Regular Diet Adult  Discharge Instruction - Regular Diet AdultRegular diet  DVT Prophylaxis: As per orthopedic service  Cunningham Catheter: Not present  Code Status: Full Code         Medically Ready for Discharge: Anticipated in 2-4 Days    Clinically Significant Risk Factors                              # Overweight: Estimated  "body mass index is 26.83 kg/m  as calculated from the following:    Height as of this encounter: 1.6 m (5' 3\").    Weight as of this encounter: 68.7 kg (151 lb 7.3 oz)., PRESENT ON ADMISSION                  The patient's care was discussed with the Patient and RN.    Valerio Joyce MD  Hospitalist Service  [facility]    ______________________________________________________________________    Interval History     Symptoms   Patient says the pain is 6/10 when she is not moving and almost 10/10 when she is moving  Review of Systems:   She wants to give up on breast-feeding and is more concerned about pain medication at this time    Data reviewed today: I reviewed all medications, new labs and imaging results over the last 24 hours.     Physical Exam   Vital Signs: Temp: 98.6  F (37  C) Temp src: Temporal BP: 100/50 Pulse: 74   Resp: 16 SpO2: 96 % O2 Device: None (Room air) Oxygen Delivery: 3 LPM  Weight: 151 lbs 7.3 oz      GENERAL: Patient is not in acute distress  HEENT: EOM+,Conjunctiva is clear   NECK:  no Jugular Venous distention  HEART: S1 S2 regular Rate and Rhythm, there is  no murmur,   LUNGS: Respirations are  not laboured, Lungs are  clear to auscultation without Crepitations or Wheezing   ABDOMEN: Soft , there is no tenderness , Bowel Sounds are  Positive   LOWER LIMBS: Left lower incision site was not seen  CNS:  Alert,  Oriented x 3, Moving all the Four Limbs     Data   Recent Labs   Lab 02/07/25  0732 02/06/25  0801   WBC  --  9.1   HGB 12.2 14.3   MCV  --  91   PLT  --  197   NA  --  138   POTASSIUM  --  4.1   CHLORIDE  --  101   CO2  --  24   BUN  --  11.5   CR  --  0.61   ANIONGAP  --  13   WISAM  --  8.8   * 96         No results found for this or any previous visit (from the past 24 hours).      "

## 2025-02-07 NOTE — PLAN OF CARE
"Pt is alert and oriented x4, was not oob due too pain, prn IV Dilaudid, oral Dilaudid, Tylenol. Pt was shaking in pain after nurse did assessment. Boot on, ice applied. Pt did not void, bladder scan 281, 313. CMS intact, no n/t. Discharge plan TBD.     Goal Outcome Evaluation:      Plan of Care Reviewed With: patient    Overall Patient Progress: no changeOverall Patient Progress: no change    Outcome Evaluation: D/c plan TBD      Problem: Adult Inpatient Plan of Care  Goal: Plan of Care Review  Description: The Plan of Care Review/Shift note should be completed every shift.  The Outcome Evaluation is a brief statement about your assessment that the patient is improving, declining, or no change.  This information will be displayed automatically on your shift  note.  Outcome: Not Progressing  Flowsheets (Taken 2/7/2025 3500)  Outcome Evaluation: D/c plan TBD  Plan of Care Reviewed With: patient  Overall Patient Progress: no change  Goal: Patient-Specific Goal (Individualized)  Description: You can add care plan individualizations to a care plan. Examples of Individualization might be:  \"Parent requests to be called daily at 9am for status\", \"I have a hard time hearing out of my right ear\", or \"Do not touch me to wake me up as it startles  me\".  Outcome: Not Progressing  Goal: Absence of Hospital-Acquired Illness or Injury  Outcome: Not Progressing  Goal: Optimal Comfort and Wellbeing  Outcome: Not Progressing  Goal: Readiness for Transition of Care  Outcome: Not Progressing     Problem: Surgery Nonspecified  Goal: Absence of Bleeding  Outcome: Not Progressing  Goal: Effective Bowel Elimination  Outcome: Not Progressing  Goal: Fluid and Electrolyte Balance  Outcome: Not Progressing  Goal: Blood Glucose Level Within Targeted Range  Outcome: Not Progressing  Goal: Absence of Infection Signs and Symptoms  Outcome: Not Progressing  Goal: Anesthesia/Sedation Recovery  Outcome: Not Progressing  Goal: Optimal Pain Control and " Function  Outcome: Not Progressing  Goal: Nausea and Vomiting Relief  Outcome: Not Progressing  Goal: Effective Urinary Elimination  Outcome: Not Progressing  Goal: Effective Oxygenation and Ventilation  Outcome: Not Progressing

## 2025-02-07 NOTE — PROGRESS NOTES
02/07/25 1517   Appointment Info   Signing Clinician's Name / Credentials (OT) Cameron Parnell EdD, OTR/L   Rehab Comments (OT) Initial evaluation and treatment   Living Environment   People in Home alone;significant other;child(awais), dependent   Current Living Arrangements assisted living;apartment  (first floor apartment)   Home Accessibility no concerns  (pt has outside parking, laundry room down the viveros)   Transportation Anticipated family or friend will provide;car, drives self   Living Environment Comments No stairs to enter building   Self-Care   Usual Activity Tolerance good   Current Activity Tolerance poor   Equipment Currently Used at Home none   Fall history within last six months yes   Number of times patient has fallen within last six months 1   Activity/Exercise/Self-Care Comment Pt reports she was independent in basic ADLS at baseline.  Works full time as a childcare provider for toddlers.   General Information   Onset of Illness/Injury or Date of Surgery 02/06/25   Referring Physician Adriana Coulter PA-C   Patient/Family Therapy Goal Statement (OT) pt wants to return to work and home, concerned about taking care of her 18 month old daughter   Additional Occupational Profile Info/Pertinent History of Current Problem Bindu Mcleod is a 34 year old lady with history of depression, generalized anxiety disorder came to [facility] 2/5/2025 for  ground-level fall with left leg pain with x-ray of the left tibia and fibula showing Mildly displaced obliquely oriented distal left tibial shaft and fibular neck fractures for which orthopedic surgery was consulted and patient underwent intramedullary nail fixation of the left tibial fracture and nonoperative care of the left fibular neck fracture without manipulation by Dr. Chase Luke, DO on 2/6/2025   Existing Precautions/Restrictions fall;brace worn when out of bed  (pt has left side walking boot)   Left Lower Extremity (Weight-bearing  Status) weight-bearing as tolerated (WBAT)   General Observations and Info Pt in bed, willing to participate   Cognitive Status Examination   Orientation Status orientation to person, place and time   Cognitive Status Comments No issues noted at this time   Visual Perception   Visual Impairment/Limitations WFL   Sensory   Sensory Quick Adds left LE   Left LE Sensory Assessment light touch awareness   Sensory Comments Pt having some trouble noting if her heel is down in the boot.   Pain Assessment   Patient Currently in Pain No   Range of Motion Comprehensive   General Range of Motion no range of motion deficits identified   Strength Comprehensive (MMT)   General Manual Muscle Testing (MMT) Assessment upper extremity strength deficits identified   Coordination   Upper Extremity Coordination No deficits were identified   Bed Mobility   Bed Mobility supine-sit   Supine-Sit [place] (Bed Mobility) verbal cues;contact guard;maximum assist (25% patient effort)   Assistive Device (Bed Mobility) bed rails   Comment (Bed Mobility) increased pain with movement.  See treatment notes   Clinical Impression   Criteria for Skilled Therapeutic Interventions Met (OT) Yes, treatment indicated   OT Diagnosis decreased independence in ADLS   OT Problem List-Impairments impacting ADL problems related to;activity tolerance impaired;fear & anxiety;pain;post-surgical precautions   Assessment of Occupational Performance 3-5 Performance Deficits   Identified Performance Deficits decreased tolerance for dressing, bathing, functional mobility, childcare, work and leisure skills   Planned Therapy Interventions (OT) ADL retraining;transfer training;home program guidelines;progressive activity/exercise   Clinical Decision Making Complexity (OT) detailed assessment/moderate complexity   Risk & Benefits of therapy have been explained evaluation/treatment results reviewed;care plan/treatment goals reviewed;patient   OT Total Evaluation Time   OT  Eval, Moderate Complexity Minutes (95483) 15   Interventions   Interventions Quick Adds Self-Care/Home Management;Therapeutic Activity   Therapeutic Activities   Therapeutic Activity Minutes (69275) 30   Symptoms noted during/after treatment fatigue;increased pain   Treatment Detail/Skilled Intervention OT: Pt in bed, willing to participate.  Boot was undone, propped up on two pillows.  Pt having pain at times, sometimes in her ankle, sometimes below the knee.  Toes on the left foot down on the sole of the boot but heal was off the sole about a finger width.  Pt having trouble determining of both her toes and heel were down on the sole of the boot.  Was able to close and adjust the boot so her foot was better in the boot.  Tried to move to a sitting position on the EOB.  Max A to help move the left leg.  Got it supported off the pillows when pt yelled out in pain and would not go further.  Rated pain at a 12/10 at that point.  Stopped transfer and went through OT POC.  Pt will need to tolerate and gain skills for functional mobility at home and for bathroom transfers and a car transfer, dressing, bathing with AE within her precautions.   OT Discharge Planning   OT Plan OT: Movement as tolerated to EOB.  LE dressing with AE.  Advance movement OOB for grooming, toilet transfers, car transfer   OT Discharge Recommendation (DC Rec) home with assist;home with outpatient occupational therapy;Acute [facility]hab [facility]-Motivated patient will benefit from intensive, interdisciplinary therapy.  Anticipate will be able to tolerate 3 hours of therapy per day   OT Rationale for DC Rec Pt significantly below her stated level of independence.  Pt's ability to participate in therapy was significantly affected by pain today, hopefully a plan for management can be devised to ease pain and allow her to participate more in therapy.  Pt is concerned with taking care of daugther as well.  Has potential to increase independence using AE to allow  for bathroom transfers, LE dressing, and safe childcare for her daugther.  Could be a good ARU candidate if can increase tolerance and mobility, will provide further recommendations as pt is able to tolerate activity out of bed.   OT Brief overview of current status Goals of therapy will be to address safe mobility and ADLS and make recommendations for discharge to the next level of care.  Pt and RN will continue to follow all fall risk precautions as documented by RN staff while hospitalized.   OT Total Distance Amb During Session (feet) 0   OT Equipment Needed at Discharge dressing equipment;raised toilet seat;reacher;shower chair;walker, rolling   Total Session Time   Timed Code Treatment Minutes 30   Total Session Time (sum of timed and untimed services) 45

## 2025-02-07 NOTE — CONSULTS
"SPIRITUAL HEALTH SERVICES - Consult Note   [facility]   Referral Source/Reason for Consult: [facility] consult.     Summary and Recommendations:   -Pt Bindu is finding support in being able to process her emotional distress related to a number of familial/relational sources of distress that include: 1) being away from her 18 month old daughter while in the hospital; 2) conflict over who is caring for her daughter [place] between her mother and between her \"ex\" who is currently caring for her; 3) intense conflict between her significant other Veto and her mother, and between Bindu and her mother.  - Bindu expresses understanding that her mother does love her and is being very protective of her.   - Bindu requested additional emotional support from  as available.      Plan:  follow-up visit with Bindu happened in the afternoon. After this, unit chaplains available during weekdays 8:00-4:30 for non-emergent emotional support for Bindu.     Christopher Perez M.Div.  Staff     [facility] available 24/7 for emergent requests/referrals, either by paging the on-call  or by entering ASAP/STAT consult in EPIC (this will also page the on-call ).     Assessment    Patient/Family Understanding of Illness and Goals of Care -   - Bindu understands she will need pain support and that she will be doing PT while in the hospital.   - Bindu epresses hope of returning home to be with her daughter Dinesh as soon as she is able.      Distress and Loss -   -Pt Bindu is finding support in being able to process her emotional distress related to a number of familial/relational sources of distress that include: 1) being away from her 18 month old daughter while in the hospital; 2) conflict over who is caring for her daughter Dinesh between her mother and between her \"ex\" who is currently caring for her; 3) intense conflict between her significant other Veto and her mother, " and between Bindu and her mother.  - Bindu expresses understanding that her mother does love her and is being very protective of her.   - Bindu requested additional emotional support from  as available.    - Bindu is missing the supportive presence of her significant other Veto who she says cannot now be at her side.      Strengths, Coping, and Resources -   Pt Bindu is finding support in being able to process her emotional distress related to a number of familial/relational sources of distress.   Pt feels a strong supportive presence with her significant other Veto who has been at her side until today, and she misses him now.     Meaning, Beliefs, and Spirituality -   Sammy finds meaning in her loved ones and has felt hurt by distanced relationships and conflicts in her family.

## 2025-02-07 NOTE — PROGRESS NOTES
Orthopedic Surgery  Bindu Mcleod  02/07/2025     Admit Date:  2/5/2025  Assessment:   POD: 1 Day Post-Op   Procedure(s):  OPEN REDUCTION INTERNAL FIXATION, FRACTURE, TIBIA, USING INTRAMEDULLARY ANDREW     Patient resting comfortably in bed, with left leg elevated/icing   Pain controlled with medication, but is currently starting to wear off, due for meds in 40 minutes  Tolerating oral intake.    Interested in going home, but is concerned about pain control    Temp:  [96.6  F (35.9  C)-98.6  F (37  C)] 97.2  F (36.2  C)  Pulse:  [61-92] 61  Resp:  [8-25] 16  BP: ()/(48-74) 97/49  SpO2:  [93 %-100 %] 97 %    Alert and oriented  Dressing is clean, dry, and intact.   Right calf is soft, non-tender.  Bilateral lower extremity is NVI.  Sensation intact bilateral lower extremities  +Dp pulse    Labs:  Recent Labs   Lab Test 02/06/25  0801 06/26/23  0510 02/20/23  1445 11/14/19  0159   WBC 9.1  --  7.6 8.0   HGB 14.3 11.9 12.2 15.1     --  222 274           Plan:   Continue mechanical SCD's, aspirin 81 mg BID x 6 weeks for DVT prophylaxis.     Mobilize with PT/OT, gait aids   WB--WBAT LLE  Ice, elevate, may adjust or loosen boot for comfort.     Continue current pain regiment   Dressings: Keep intact.  Change if >60% saturated or peeling off.   Follow-up: 2 weeks post-op with Dr Faust team for incision check, likely suture removal and full length tib/fib x-rays    Disposition:   Anticipate d/c to home when pain is controlled on oral meds,medically cleared and progressing in PT, likely tomorrow    Katelin Flores PA-C  [facility]

## 2025-02-08 ENCOUNTER — APPOINTMENT (OUTPATIENT)
Dept: PHYSICAL THERAPY | Facility: CLINIC | Age: 35
End: 2025-02-08
Payer: COMMERCIAL

## 2025-02-08 ENCOUNTER — APPOINTMENT (OUTPATIENT)
Dept: OCCUPATIONAL THERAPY | Facility: CLINIC | Age: 35
End: 2025-02-08
Payer: COMMERCIAL

## 2025-02-08 LAB
GLUCOSE SERPL-MCNC: 97 MG/DL (ref 70–99)
HGB BLD-MCNC: 10.7 G/DL (ref 11.7–15.7)

## 2025-02-08 PROCEDURE — 36415 COLL VENOUS BLD VENIPUNCTURE: CPT | Performed by: INTERNAL MEDICINE

## 2025-02-08 PROCEDURE — 250N000013 HC RX MED GY IP 250 OP 250 PS 637: Performed by: INTERNAL MEDICINE

## 2025-02-08 PROCEDURE — 250N000011 HC RX IP 250 OP 636: Mod: JZ | Performed by: INTERNAL MEDICINE

## 2025-02-08 PROCEDURE — 85018 HEMOGLOBIN: CPT

## 2025-02-08 PROCEDURE — 120N000001 HC R&B MED SURG/OB

## 2025-02-08 PROCEDURE — 97535 SELF CARE MNGMENT TRAINING: CPT | Mod: GO | Performed by: OCCUPATIONAL THERAPIST

## 2025-02-08 PROCEDURE — 250N000013 HC RX MED GY IP 250 OP 250 PS 637

## 2025-02-08 PROCEDURE — 97161 PT EVAL LOW COMPLEX 20 MIN: CPT | Mod: GP

## 2025-02-08 PROCEDURE — 97530 THERAPEUTIC ACTIVITIES: CPT | Mod: GP

## 2025-02-08 PROCEDURE — 250N000013 HC RX MED GY IP 250 OP 250 PS 637: Performed by: STUDENT IN AN ORGANIZED HEALTH CARE EDUCATION/TRAINING PROGRAM

## 2025-02-08 PROCEDURE — 99232 SBSQ HOSP IP/OBS MODERATE 35: CPT | Performed by: INTERNAL MEDICINE

## 2025-02-08 PROCEDURE — 82947 ASSAY GLUCOSE BLOOD QUANT: CPT | Performed by: INTERNAL MEDICINE

## 2025-02-08 RX ORDER — HYDROXYZINE HYDROCHLORIDE 25 MG/1
25 TABLET, FILM COATED ORAL EVERY 6 HOURS PRN
Status: DISCONTINUED | OUTPATIENT
Start: 2025-02-08 | End: 2025-02-11 | Stop reason: HOSPADM

## 2025-02-08 RX ORDER — HYDROMORPHONE HYDROCHLORIDE 2 MG/1
4 TABLET ORAL
Status: DISCONTINUED | OUTPATIENT
Start: 2025-02-08 | End: 2025-02-10

## 2025-02-08 RX ORDER — HYDROMORPHONE HYDROCHLORIDE 2 MG/1
2 TABLET ORAL
Status: DISCONTINUED | OUTPATIENT
Start: 2025-02-08 | End: 2025-02-10

## 2025-02-08 RX ORDER — HYDROXYZINE HYDROCHLORIDE 50 MG/1
50 TABLET, FILM COATED ORAL EVERY 6 HOURS PRN
Status: DISCONTINUED | OUTPATIENT
Start: 2025-02-08 | End: 2025-02-11 | Stop reason: HOSPADM

## 2025-02-08 RX ORDER — METHOCARBAMOL 750 MG/1
750 TABLET, FILM COATED ORAL 4 TIMES DAILY PRN
Status: DISCONTINUED | OUTPATIENT
Start: 2025-02-08 | End: 2025-02-11 | Stop reason: HOSPADM

## 2025-02-08 RX ADMIN — HYDROMORPHONE HYDROCHLORIDE 0.6 MG: 0.2 INJECTION, SOLUTION INTRAMUSCULAR; INTRAVENOUS; SUBCUTANEOUS at 08:39

## 2025-02-08 RX ADMIN — ACETAMINOPHEN 650 MG: 325 TABLET, FILM COATED ORAL at 08:38

## 2025-02-08 RX ADMIN — HYDROMORPHONE HYDROCHLORIDE 4 MG: 2 TABLET ORAL at 01:38

## 2025-02-08 RX ADMIN — ASPIRIN 81 MG: 81 TABLET, COATED ORAL at 20:41

## 2025-02-08 RX ADMIN — IBUPROFEN 600 MG: 600 TABLET, FILM COATED ORAL at 08:37

## 2025-02-08 RX ADMIN — PANTOPRAZOLE SODIUM 40 MG: 40 TABLET, DELAYED RELEASE ORAL at 08:38

## 2025-02-08 RX ADMIN — ASPIRIN 81 MG: 81 TABLET, COATED ORAL at 08:37

## 2025-02-08 RX ADMIN — POLYETHYLENE GLYCOL 3350 17 G: 17 POWDER, FOR SOLUTION ORAL at 08:38

## 2025-02-08 RX ADMIN — HYDROMORPHONE HYDROCHLORIDE 4 MG: 2 TABLET ORAL at 20:39

## 2025-02-08 RX ADMIN — CYCLOBENZAPRINE HYDROCHLORIDE 10 MG: 5 TABLET, FILM COATED ORAL at 08:38

## 2025-02-08 RX ADMIN — HYDROMORPHONE HYDROCHLORIDE 0.6 MG: 0.2 INJECTION, SOLUTION INTRAMUSCULAR; INTRAVENOUS; SUBCUTANEOUS at 17:40

## 2025-02-08 RX ADMIN — HYDROMORPHONE HYDROCHLORIDE 0.6 MG: 0.2 INJECTION, SOLUTION INTRAMUSCULAR; INTRAVENOUS; SUBCUTANEOUS at 13:10

## 2025-02-08 RX ADMIN — HYDROMORPHONE HYDROCHLORIDE 4 MG: 2 TABLET ORAL at 15:41

## 2025-02-08 RX ADMIN — HYDROXYZINE HYDROCHLORIDE 25 MG: 25 TABLET ORAL at 20:41

## 2025-02-08 RX ADMIN — ACETAMINOPHEN 650 MG: 325 TABLET, FILM COATED ORAL at 15:42

## 2025-02-08 RX ADMIN — HYDROXYZINE HYDROCHLORIDE 25 MG: 25 TABLET ORAL at 13:11

## 2025-02-08 RX ADMIN — METHOCARBAMOL 750 MG: 750 TABLET ORAL at 13:10

## 2025-02-08 RX ADMIN — HYDROXYZINE HYDROCHLORIDE 50 MG: 50 TABLET ORAL at 15:41

## 2025-02-08 RX ADMIN — ACETAMINOPHEN 650 MG: 325 TABLET, FILM COATED ORAL at 01:38

## 2025-02-08 ASSESSMENT — ACTIVITIES OF DAILY LIVING (ADL)
ADLS_ACUITY_SCORE: 27
ADLS_ACUITY_SCORE: 29
ADLS_ACUITY_SCORE: 27
ADLS_ACUITY_SCORE: 29
ADLS_ACUITY_SCORE: 27
ADLS_ACUITY_SCORE: 29
ADLS_ACUITY_SCORE: 27
ADLS_ACUITY_SCORE: 29
ADLS_ACUITY_SCORE: 27
ADLS_ACUITY_SCORE: 27
ADLS_ACUITY_SCORE: 29
ADLS_ACUITY_SCORE: 27
ADLS_ACUITY_SCORE: 27
ADLS_ACUITY_SCORE: 29
ADLS_ACUITY_SCORE: 27
ADLS_ACUITY_SCORE: 27
ADLS_ACUITY_SCORE: 29
ADLS_ACUITY_SCORE: 29
ADLS_ACUITY_SCORE: 27

## 2025-02-08 NOTE — PLAN OF CARE
"Goal Outcome Evaluation:      Plan of Care Reviewed With: patient    Overall Patient Progress: no changeOverall Patient Progress: no change         [id number]  Pain controlled overnight with oral prns. Oral dilaudid 4mg given 2049, 0138. PRN ibuprofen 2324  Not voiding well overnight. Bladder scanned at 0124 for 790 ml. Pt voided and had a PVR of 588 ml at 132 this AM. Pt has been refusing straight cath per protocol.   Allowed straight cath at 0506 removed 700 ml        Problem: Adult Inpatient Plan of Care  Goal: Plan of Care Review  Description: The Plan of Care Review/Shift note should be completed every shift.  The Outcome Evaluation is a brief statement about your assessment that the patient is improving, declining, or no change.  This information will be displayed automatically on your shift  note.  Outcome: Progressing  Flowsheets (Taken 2/8/2025 0326)  Plan of Care Reviewed With: patient  Overall Patient Progress: no change  Goal: Patient-Specific Goal (Individualized)  Description: You can add care plan individualizations to a care plan. Examples of Individualization might be:  \"Parent requests to be called daily at 9am for status\", \"I have a hard time hearing out of my right ear\", or \"Do not touch me to wake me up as it startles  me\".  Outcome: Progressing  Goal: Absence of Hospital-Acquired Illness or Injury  Outcome: Progressing  Intervention: Identify and Manage Fall Risk  Recent Flowsheet Documentation  Taken 2/7/2025 2049 by Rikki Huerta, RN  Safety Promotion/Fall Prevention:   activity supervised   lighting adjusted   mobility aid in reach   nonskid shoes/slippers when out of bed   room near nurse's station   room organization consistent   safety round/check completed  Intervention: Prevent Skin Injury  Recent Flowsheet Documentation  Taken 2/7/2025 2049 by Rikki Huerta, RN  Body Position: weight shifting  Intervention: Prevent and Manage VTE (Venous Thromboembolism) Risk  Recent Flowsheet " Documentation  Taken 2/7/2025 2049 by Rikki Huerta, RN  VTE Prevention/Management: SCDs off (sequential compression devices)  Intervention: Prevent Infection  Recent Flowsheet Documentation  Taken 2/7/2025 2049 by Rikki Huerta, RN  Infection Prevention:   hand hygiene promoted   rest/sleep promoted  Goal: Optimal Comfort and Wellbeing  Outcome: Progressing  Intervention: Monitor Pain and Promote Comfort  Recent Flowsheet Documentation  Taken 2/7/2025 2049 by Rikki Huerta, RN  Pain Management Interventions:   medication (see MAR)   cold applied  Goal: Readiness for Transition of Care  Outcome: Progressing

## 2025-02-08 NOTE — PROGRESS NOTES
Orthopedic Surgery  Bindu Mcleod  02/08/2025     Admit Date:  2/5/2025  Assessment:   POD: 2 Days Post-Op   Procedure(s):  Intramedullary nail fixation left tibia fracture Nonoperative care left fibular neck fracture without manipulation     Patient resting in bed and about to begin physical therapy session. Patient had difficulty with mobilizing yesterday due to amount of pain, and noted lethargy after Flexeril possibly. This morning, pain is controlled at rest. We will continue to work on pain control today.   Tolerating oral intake.    Denies nausea or vomiting  Denies chest pain or shortness of breath  VSS, afebrile     Temp:  [97.9  F (36.6  C)-98.6  F (37  C)] 97.9  F (36.6  C)  Pulse:  [68-74] 68  Resp:  [16] 16  BP: ()/(46-50) 90/46  SpO2:  [96 %-100 %] 100 %    Alert and oriented  Surgical and ace dressing is clean, dry, and intact.   Minimal erythema of the surrounding skin.   Bilateral calves are soft, non-tender.  Left lower extremity is NVI.  Sensation intact bilateral lower extremities  Dp pulse palpable     Labs:  Recent Labs   Lab Test 02/08/25  0617 02/07/25  0732 02/06/25  0801 06/26/23  0510 02/20/23  1445 11/14/19  0159   WBC  --   --  9.1  --  7.6 8.0   HGB 10.7* 12.2 14.3   < > 12.2 15.1   PLT  --   --  197  --  222 274    < > = values in this interval not displayed.           Plan:              Continue mechanical SCD's, aspirin 81 mg BID x 6 weeks for DVT prophylaxis.                Mobilize with PT/OT, gait aids              WBAT LLE  Ice, elevate, CAM boot only required when mobilizing. Blue foam wedge ordered from supply for elevation. CAM boot can be removed when resting and elevating in bed.   Continue current pain regimen. I did change Flexeril to Robaxin, added atarax prn and increased dilaudid dose.              Dressings: Keep intact.  Change if >60% saturated or peeling off.   Follow-up: 2 weeks post-op with Dr Luke team for incision check, likely suture removal  and full length tib/fib x-rays     Disposition:   Anticipate d/c to home when pain is controlled on oral meds,medically cleared and progressing in PT    Mariah Calderon PA-C

## 2025-02-08 NOTE — PROGRESS NOTES
Hospitalist Medicine Progress Note   [facility]       Bindu cMleod is a 34 year old lady with history of depression, generalized anxiety disorder came to [facility] 2/5/2025 for  ground-level fall with left leg pain with x-ray of the left tibia and fibula showing Mildly displaced obliquely oriented distal left tibial shaft and fibular neck fractures for which orthopedic surgery was consulted and patient underwent intramedullary nail fixation of the left tibial fracture and nonoperative care of the left fibular neck fracture without manipulation by Dr. Chase Luke, DO on 2/6/2025       Date of Admission:  2/5/2025  Assessment & Plan     Displaced Left Tibial Shaft & Fibular Neck Fractures  Mechanical Ground-Level Fall  -Presents after a ground-level fall while slipping on the ice  -XR with mildly displaced obliquely oriented distal left tibial shaft and fibular neck fractures  -Pain regimen -patient is breast-feeding and wants pain medication that this is safer during breast-feeding I will order ibuprofen which is one of the safest during breast-feeding and will like to avoid oxycodone  -I will increase the frequency of Dilaudid to every 4 hours from every 6 hours  -Add Flexeril  -Increase the dose of ibuprofen to 600 mg 4 times daily  -We will give PPI with ibuprofen     MDD & CHARISMA  -Not on any prescription medications for these issues    Postop anemia        Plan:   Discharge when okay with orthopedic surgery  Monitor hemoglobin    Diet: Advance Diet as Tolerated: Regular Diet Adult  Discharge Instruction - Regular Diet AdultRegular diet  DVT Prophylaxis: As per orthopedic service  Cunningham Catheter: Not present  Code Status: Full Code         Medically Ready for Discharge: Anticipated in 2-4 Days    Clinically Significant Risk Factors                              # Overweight: Estimated body mass index is 26.83 kg/m  as calculated from the following:    Height as of this  "encounter: 1.6 m (5' 3\").    Weight as of this encounter: 68.7 kg (151 lb 7.3 oz)., PRESENT ON ADMISSION                  The patient's care was discussed with the Patient and RN.    Valerio Joyce MD  Hospitalist Service  [facility]    ______________________________________________________________________    Interval History     Symptoms   Pain control is better today as compared to yesterday 4/10 when not moving    Review of Systems:   Denies any other symptoms    Data reviewed today: I reviewed all medications, new labs and imaging results over the last 24 hours.     Physical Exam   Vital Signs: Temp: 97.9  F (36.6  C) Temp src: Temporal BP: 90/46 Pulse: 68   Resp: 16 SpO2: 100 % O2 Device: None (Room air)    Weight: 151 lbs 7.3 oz      GENERAL: Patient is not in acute distress  HEENT: EOM+,Conjunctiva is clear   NECK:  no Jugular Venous distention  HEART: S1 S2 regular Rate and Rhythm, there is  no murmur,   LUNGS: Respirations are  not laboured, Lungs are  clear to auscultation without Crepitations or Wheezing   ABDOMEN: Soft , there is no tenderness , Bowel Sounds are  Positive   LOWER LIMBS: Left lower incision site was not seen  CNS:  Alert,  Oriented x 3, Moving all the Four Limbs     Data   Recent Labs   Lab 02/08/25  0617 02/07/25  0732 02/06/25  0801   WBC  --   --  9.1   HGB 10.7* 12.2 14.3   MCV  --   --  91   PLT  --   --  197   NA  --   --  138   POTASSIUM  --   --  4.1   CHLORIDE  --   --  101   CO2  --   --  24   BUN  --   --  11.5   CR  --   --  0.61   ANIONGAP  --   --  13   WISAM  --   --  8.8   GLC 97 103* 96         No results found for this or any previous visit (from the past 24 hours).      "

## 2025-02-09 ENCOUNTER — APPOINTMENT (OUTPATIENT)
Dept: OCCUPATIONAL THERAPY | Facility: CLINIC | Age: 35
End: 2025-02-09
Payer: COMMERCIAL

## 2025-02-09 ENCOUNTER — APPOINTMENT (OUTPATIENT)
Dept: PHYSICAL THERAPY | Facility: CLINIC | Age: 35
End: 2025-02-09
Payer: COMMERCIAL

## 2025-02-09 PROCEDURE — 97530 THERAPEUTIC ACTIVITIES: CPT | Mod: GP

## 2025-02-09 PROCEDURE — 250N000013 HC RX MED GY IP 250 OP 250 PS 637: Performed by: STUDENT IN AN ORGANIZED HEALTH CARE EDUCATION/TRAINING PROGRAM

## 2025-02-09 PROCEDURE — 120N000001 HC R&B MED SURG/OB

## 2025-02-09 PROCEDURE — 97535 SELF CARE MNGMENT TRAINING: CPT | Mod: GO

## 2025-02-09 PROCEDURE — 99232 SBSQ HOSP IP/OBS MODERATE 35: CPT | Performed by: INTERNAL MEDICINE

## 2025-02-09 PROCEDURE — 97116 GAIT TRAINING THERAPY: CPT | Mod: GP

## 2025-02-09 PROCEDURE — 250N000013 HC RX MED GY IP 250 OP 250 PS 637: Performed by: INTERNAL MEDICINE

## 2025-02-09 PROCEDURE — 250N000013 HC RX MED GY IP 250 OP 250 PS 637

## 2025-02-09 PROCEDURE — 250N000011 HC RX IP 250 OP 636: Mod: JZ | Performed by: INTERNAL MEDICINE

## 2025-02-09 RX ADMIN — HYDROMORPHONE HYDROCHLORIDE 4 MG: 2 TABLET ORAL at 16:47

## 2025-02-09 RX ADMIN — HYDROXYZINE HYDROCHLORIDE 25 MG: 25 TABLET ORAL at 16:49

## 2025-02-09 RX ADMIN — ACETAMINOPHEN 650 MG: 325 TABLET, FILM COATED ORAL at 02:39

## 2025-02-09 RX ADMIN — HYDROMORPHONE HYDROCHLORIDE 4 MG: 2 TABLET ORAL at 12:57

## 2025-02-09 RX ADMIN — ACETAMINOPHEN 650 MG: 325 TABLET, FILM COATED ORAL at 16:48

## 2025-02-09 RX ADMIN — HYDROMORPHONE HYDROCHLORIDE 2 MG: 2 TABLET ORAL at 21:15

## 2025-02-09 RX ADMIN — HYDROXYZINE HYDROCHLORIDE 50 MG: 50 TABLET ORAL at 08:49

## 2025-02-09 RX ADMIN — ASPIRIN 81 MG: 81 TABLET, COATED ORAL at 20:10

## 2025-02-09 RX ADMIN — ACETAMINOPHEN 650 MG: 325 TABLET, FILM COATED ORAL at 08:49

## 2025-02-09 RX ADMIN — METHOCARBAMOL 750 MG: 750 TABLET ORAL at 16:49

## 2025-02-09 RX ADMIN — ASPIRIN 81 MG: 81 TABLET, COATED ORAL at 08:48

## 2025-02-09 RX ADMIN — HYDROMORPHONE HYDROCHLORIDE 0.6 MG: 0.2 INJECTION, SOLUTION INTRAMUSCULAR; INTRAVENOUS; SUBCUTANEOUS at 08:48

## 2025-02-09 RX ADMIN — POLYETHYLENE GLYCOL 3350 17 G: 17 POWDER, FOR SOLUTION ORAL at 08:49

## 2025-02-09 RX ADMIN — HYDROMORPHONE HYDROCHLORIDE 0.6 MG: 0.2 INJECTION, SOLUTION INTRAMUSCULAR; INTRAVENOUS; SUBCUTANEOUS at 18:53

## 2025-02-09 RX ADMIN — Medication 5 MG: at 21:15

## 2025-02-09 RX ADMIN — HYDROMORPHONE HYDROCHLORIDE 4 MG: 2 TABLET ORAL at 09:55

## 2025-02-09 RX ADMIN — METHOCARBAMOL 750 MG: 750 TABLET ORAL at 08:49

## 2025-02-09 RX ADMIN — PANTOPRAZOLE SODIUM 40 MG: 40 TABLET, DELAYED RELEASE ORAL at 08:49

## 2025-02-09 ASSESSMENT — ACTIVITIES OF DAILY LIVING (ADL)
ADLS_ACUITY_SCORE: 29
ADLS_ACUITY_SCORE: 31
ADLS_ACUITY_SCORE: 29
ADLS_ACUITY_SCORE: 31
ADLS_ACUITY_SCORE: 29

## 2025-02-09 NOTE — PLAN OF CARE
"Pt alert and orientedx4. Assist ofx1 to bedside commode. Pain given scheduled Tylenol. Refused again to be ambulated to bathroom . Slept most of the time during the shift. Dresssing is clean and intact. Possible discharge.  Problem: Adult Inpatient Plan of Care  Goal: Plan of Care Review  Description: The Plan of Care Review/Shift note should be completed every shift.  The Outcome Evaluation is a brief statement about your assessment that the patient is improving, declining, or no change.  This information will be displayed automatically on your shift  note.  Outcome: Progressing  Flowsheets (Taken 2/9/2025 0620)  Outcome Evaluation: pt assistx1 with gb to bedside commode.  Plan of Care Reviewed With: patient  Overall Patient Progress: improving  Goal: Patient-Specific Goal (Individualized)  Description: You can add care plan individualizations to a care plan. Examples of Individualization might be:  \"Parent requests to be called daily at 9am for status\", \"I have a hard time hearing out of my right ear\", or \"Do not touch me to wake me up as it startles  me\".  Outcome: Progressing  Goal: Absence of Hospital-Acquired Illness or Injury  Outcome: Progressing  Intervention: Identify and Manage Fall Risk  Recent Flowsheet Documentation  Taken 2/9/2025 0132 by Cassandra Kay RN  Safety Promotion/Fall Prevention: activity supervised  Intervention: Prevent Skin Injury  Recent Flowsheet Documentation  Taken 2/9/2025 0019 by Cassandra Kay RN  Body Position: position changed independently  Intervention: Prevent and Manage VTE (Venous Thromboembolism) Risk  Recent Flowsheet Documentation  Taken 2/9/2025 0132 by Cassandra Kay RN  VTE Prevention/Management: SCDs off (sequential compression devices)  Intervention: Prevent Infection  Recent Flowsheet Documentation  Taken 2/9/2025 0132 by Cassandra Kay RN  Infection Prevention: rest/sleep promoted  Goal: Optimal Comfort and Wellbeing  Outcome: Progressing  Goal: Readiness for " Transition of Care  Outcome: Progressing     Problem: Surgery Nonspecified  Goal: Absence of Bleeding  Outcome: Progressing  Goal: Effective Bowel Elimination  Outcome: Progressing  Goal: Fluid and Electrolyte Balance  Outcome: Progressing  Goal: Blood Glucose Level Within Targeted Range  Outcome: Progressing  Goal: Absence of Infection Signs and Symptoms  Outcome: Progressing  Goal: Anesthesia/Sedation Recovery  Outcome: Progressing  Intervention: Optimize Anesthesia Recovery  Recent Flowsheet Documentation  Taken 2/9/2025 0132 by Cassandra Kay, KAYLA  Safety Promotion/Fall Prevention: activity supervised  Goal: Optimal Pain Control and Function  Outcome: Progressing  Goal: Nausea and Vomiting Relief  Outcome: Progressing  Goal: Effective Urinary Elimination  Outcome: Progressing  Goal: Effective Oxygenation and Ventilation  Outcome: Progressing  Intervention: Optimize Oxygenation and Ventilation  Recent Flowsheet Documentation  Taken 2/9/2025 0132 by Cassandra Kay, RN  Activity Management: activity adjusted per tolerance  Taken 2/9/2025 0019 by Cassandra Kay, RN  Activity Management:   activity adjusted per tolerance   up to bedside commode   back to bed   Goal Outcome Evaluation:      Plan of Care Reviewed With: patient    Overall Patient Progress: improvingOverall Patient Progress: improving    Outcome Evaluation: pt assistx1 with gb to bedside commode.

## 2025-02-09 NOTE — PROGRESS NOTES
Orthopedic Surgery  Bindu Mcleod  02/09/2025     Admit Date:  2/5/2025  Assessment:   POD: 3 Days Post-Op   Procedure(s):  Intramedullary nail fixation left tibia fracture Nonoperative care left fibular neck fracture without manipulation     Patient resting in bed. Patient had difficulty with mobilizing following surgery due to pain. Yesterday, patient notes improvement with PT initially yesterday but then difficulty standing out of bed with OT later in the afternoon. She is just receiving a dose of pain meds this morning after falling behind overnight on pain control.   Tolerating oral intake.    Denies nausea or vomiting  Denies chest pain or shortness of breath  VSS, afebrile     Temp:  [98  F (36.7  C)-98.4  F (36.9  C)] 98.4  F (36.9  C)  Pulse:  [79-92] 82  Resp:  [16-18] 16  BP: ()/(39-49) 108/49  SpO2:  [98 %] 98 %    Alert and oriented  Surgical and ace dressing is clean, dry, and intact.   Minimal erythema of the surrounding skin.   Bilateral calves are soft, non-tender.  Left lower extremity is NVI.  Sensation intact bilateral lower extremities  Dp pulse palpable     Labs:  Recent Labs   Lab Test 02/08/25  0617 02/07/25  0732 02/06/25  0801 06/26/23  0510 02/20/23  1445 11/14/19  0159   WBC  --   --  9.1  --  7.6 8.0   HGB 10.7* 12.2 14.3   < > 12.2 15.1   PLT  --   --  197  --  222 274    < > = values in this interval not displayed.           Plan:              Continue mechanical SCD's, aspirin 81 mg BID x 6 weeks for DVT prophylaxis.                Mobilize with PT/OT, gait aids              WBAT LLE  Ice, elevate, CAM boot only required when mobilizing. Blue foam wedge ordered from supply for elevation. CAM boot can be removed when resting and elevating in bed.   Continue current pain regimen              Dressings: Keep intact.  Change if >60% saturated or peeling off.   Follow-up: 2 weeks post-op with Dr Luke team for incision check, likely suture removal and full length tib/fib  x-rays     Disposition:   Anticipate d/c to home when pain is controlled on oral meds,medically cleared and progressing in PT    Mariah Calderon PA-C

## 2025-02-09 NOTE — PROGRESS NOTES
Hospitalist Medicine Progress Note   [facility]       Bindu Mcleod is a 34 year old lady with history of depression, generalized anxiety disorder came to [facility] 2/5/2025 for  ground-level fall with left leg pain with x-ray of the left tibia and fibula showing Mildly displaced obliquely oriented distal left tibial shaft and fibular neck fractures for which orthopedic surgery was consulted and patient underwent intramedullary nail fixation of the left tibial fracture and nonoperative care of the left fibular neck fracture without manipulation by Dr. Chase Luke, DO on 2/6/2025       Date of Admission:  2/5/2025  Assessment & Plan     Displaced Left Tibial Shaft & Fibular Neck Fractures  Mechanical Ground-Level Fall  -Presents after a ground-level fall while slipping on the ice  -XR with mildly displaced obliquely oriented distal left tibial shaft and fibular neck fractures  -Pain regimen -patient is breast-feeding and wants pain medication that this is safer during breast-feeding I will order ibuprofen which is one of the safest during breast-feeding and will like to avoid oxycodone  -I will increase the frequency of Dilaudid to every 4 hours from every 6 hours  -Add Flexeril  -Increased the dose of ibuprofen to 600 mg 4 times daily  -on PPI with ibuprofen     MDD & CHARISMA  -Not on any prescription medications for these issues    Postop anemia        Plan:   Discharge when okay with orthopedic surgery - patient wants to go tomorrow   Monitor hemoglobin    Diet: Advance Diet as Tolerated: Regular Diet Adult  Discharge Instruction - Regular Diet AdultRegular diet  DVT Prophylaxis: As per orthopedic service  Cunningham Catheter: Not present  Code Status: Full Code         Medically Ready for Discharge: Anticipated in 2-4 Days    Clinically Significant Risk Factors                              # Overweight: Estimated body mass index is 26.83 kg/m  as calculated from the  "following:    Height as of this encounter: 1.6 m (5' 3\").    Weight as of this encounter: 68.7 kg (151 lb 7.3 oz)., PRESENT ON ADMISSION                  The patient's care was discussed with the Patient and RN.    Valerio Joyce MD  Hospitalist Service  [facility]    ______________________________________________________________________    Interval History     Symptoms   Pain control is better today as compared to yesterday never the less patient says that she is having significant pain moving in and out of the bed     Review of Systems:   Denies any other symptoms    Data reviewed today: I reviewed all medications, new labs and imaging results over the last 24 hours.     Physical Exam   Vital Signs: Temp: 98.4  F (36.9  C) Temp src: Temporal BP: 108/49 Pulse: 82   Resp: 16 SpO2: 98 % O2 Device: None (Room air)    Weight: 151 lbs 7.3 oz      GENERAL: Patient is not in acute distress  HEENT: EOM+,Conjunctiva is clear   NECK:  no Jugular Venous distention  HEART: S1 S2 regular Rate and Rhythm, there is  no murmur,   LUNGS: Respirations are  not laboured, Lungs are  clear to auscultation without Crepitations or Wheezing   ABDOMEN: Soft , there is no tenderness , Bowel Sounds are  Positive   LOWER LIMBS: Left lower incision site was not seen  CNS:  Alert,  Oriented x 3, Moving all the Four Limbs     Data   Recent Labs   Lab 02/08/25  0617 02/07/25  0732 02/06/25  0801   WBC  --   --  9.1   HGB 10.7* 12.2 14.3   MCV  --   --  91   PLT  --   --  197   NA  --   --  138   POTASSIUM  --   --  4.1   CHLORIDE  --   --  101   CO2  --   --  24   BUN  --   --  11.5   CR  --   --  0.61   ANIONGAP  --   --  13   WISAM  --   --  8.8   GLC 97 103* 96         No results found for this or any previous visit (from the past 24 hours).      "

## 2025-02-09 NOTE — PLAN OF CARE
"Goal Outcome Evaluation:      Plan of Care Reviewed With: patient    Overall Patient Progress: improvingOverall Patient Progress: improving    Patient vital signs are at baseline: Yes  Patient able to ambulate as they were prior to admission or with assist devices provided by therapies during their stay:  No,  Reason:  transfers to bedside commode not walking to bathroom yet  Patient MUST void prior to discharge:  Yes  Patient able to tolerate oral intake:  Yes  Pain has adequate pain control using Oral analgesics:  Yes  Does patient have an identified :  Yes  Has goal D/C date and time been discussed with patient:  Yes  Dressing CDI  CMS intact  Plan is to discharge home possibly tomorrow  Problem: Adult Inpatient Plan of Care  Goal: Plan of Care Review  Description: The Plan of Care Review/Shift note should be completed every shift.  The Outcome Evaluation is a brief statement about your assessment that the patient is improving, declining, or no change.  This information will be displayed automatically on your shift  note.  Outcome: Progressing  Flowsheets (Taken 2/8/2025 2251)  Plan of Care Reviewed With: patient  Overall Patient Progress: improving  Goal: Patient-Specific Goal (Individualized)  Description: You can add care plan individualizations to a care plan. Examples of Individualization might be:  \"Parent requests to be called daily at 9am for status\", \"I have a hard time hearing out of my right ear\", or \"Do not touch me to wake me up as it startles  me\".  Outcome: Progressing  Goal: Absence of Hospital-Acquired Illness or Injury  Outcome: Progressing  Intervention: Identify and Manage Fall Risk  Recent Flowsheet Documentation  Taken 2/8/2025 2039 by Katelyn Mack RN  Safety Promotion/Fall Prevention:   activity supervised   assistive device/personal items within reach   clutter free environment maintained   nonskid shoes/slippers when out of bed  Intervention: Prevent Skin Injury  Recent " Flowsheet Documentation  Taken 2/8/2025 2039 by Katelyn Mack RN  Body Position: position changed independently  Intervention: Prevent and Manage VTE (Venous Thromboembolism) Risk  Recent Flowsheet Documentation  Taken 2/8/2025 2039 by Katelyn Mack RN  VTE Prevention/Management: SCDs off (sequential compression devices)  Goal: Optimal Comfort and Wellbeing  Outcome: Progressing  Intervention: Monitor Pain and Promote Comfort  Recent Flowsheet Documentation  Taken 2/8/2025 2039 by Katelyn Mack RN  Pain Management Interventions: medication (see MAR)  Goal: Readiness for Transition of Care  Outcome: Progressing     Problem: Surgery Nonspecified  Goal: Absence of Bleeding  Outcome: Progressing  Intervention: Monitor and Manage Bleeding  Recent Flowsheet Documentation  Taken 2/8/2025 2039 by Katelyn Mack RN  Bleeding Management: dressing monitored  Goal: Effective Bowel Elimination  Outcome: Progressing  Goal: Fluid and Electrolyte Balance  Outcome: Progressing  Goal: Blood Glucose Level Within Targeted Range  Outcome: Progressing  Goal: Absence of Infection Signs and Symptoms  Outcome: Progressing  Goal: Anesthesia/Sedation Recovery  Outcome: Progressing  Intervention: Optimize Anesthesia Recovery  Recent Flowsheet Documentation  Taken 2/8/2025 2039 by Katelyn Mack RN  Safety Promotion/Fall Prevention:   activity supervised   assistive device/personal items within reach   clutter free environment maintained   nonskid shoes/slippers when out of bed  Goal: Optimal Pain Control and Function  Outcome: Progressing  Intervention: Prevent or Manage Pain  Recent Flowsheet Documentation  Taken 2/8/2025 2039 by Katelyn Mack RN  Pain Management Interventions: medication (see MAR)  Goal: Nausea and Vomiting Relief  Outcome: Progressing  Goal: Effective Urinary Elimination  Outcome: Progressing  Goal: Effective Oxygenation and Ventilation  Outcome: Progressing  Intervention: Optimize  Oxygenation and Ventilation  Recent Flowsheet Documentation  Taken 2/8/2025 2039 by Katelyn Mack, RN  Activity Management: activity adjusted per tolerance

## 2025-02-09 NOTE — PROGRESS NOTES
02/08/25 1105   Appointment Info   Signing Clinician's Name / Credentials (PT) Hattie Trevino DPT   Rehab Comments (PT) WBAT with CAM boot left LE   Living Environment   People in Home significant other;child(awais), dependent   Current Living Arrangements apartment   Home Accessibility no concerns   Self-Care   Usual Activity Tolerance good   Current Activity Tolerance poor  (secondary to pain)   Equipment Currently Used at Home none   Fall history within last six months yes   Number of times patient has fallen within last six months 1   Activity/Exercise/Self-Care Comment Independent with all mobility at baseline, works in [employer]   General Information   Onset of Illness/Injury or Date of Surgery 02/05/25   Referring Physician Adriana eDl Cid PA-C   Patient/Family Therapy Goals Statement (PT) return to home   Pertinent History of Current Problem (include personal factors and/or comorbidities that impact the POC) Patient seen POD#2 s/p intramedullary nail fixation left tibia fracture, non operative care left fibular neck fracture secondary to fall on icy sidewalk.   Existing Precautions/Restrictions fall;weight bearing   Weight-Bearing Status - LLE weight-bearing as tolerated  (with CAM boot)   Cognition   Affect/Mental Status (Cognition) anxious   Orientation Status (Cognition) oriented x 4   Pain Assessment   Patient Currently in Pain Yes, see Vital Sign flowsheet   Integumentary/Edema   Integumentary/Edema Comments ace wrap on left LE from base of toes to above knee   Posture    Posture Forward head position;Protracted shoulders   Range of Motion (ROM)   Range of Motion ROM deficits secondary to surgical procedure;ROM deficits secondary to pain;ROM deficits secondary to swelling   Strength (Manual Muscle Testing)   Strength (Manual Muscle Testing) Deficits observed during functional mobility   Bed Mobility   Comment, (Bed Mobility) Required mod A at left LE   Transfers   Transfers sit-stand transfer    Sit-Stand Transfer   Sit-Stand Pleasants (Transfers) contact guard;verbal cues   Assistive Device (Sit-Stand Transfers) walker, front-wheeled   Gait/Stairs (Locomotion)   Pleasants Level (Gait) contact guard   Assistive Device (Gait) walker, front-wheeled   Balance   Balance Comments Patient at increased risk of falling secondary to pain, decreased weight shift over left LE   Sensory Examination   Sensory Perception patient reports no sensory changes   Clinical Impression   Criteria for Skilled Therapeutic Intervention Yes, treatment indicated   PT Diagnosis (PT) Impaired functional mobility   Influenced by the following impairments pain, decreased strength, activity tolerance, balance, cognition   Functional limitations due to impairments difficulties with transfers and ambulation   Clinical Presentation (PT Evaluation Complexity) stable   Clinical Presentation Rationale clinical judgement   Clinical Decision Making (Complexity) low complexity   Planned Therapy Interventions (PT) balance training;bed mobility training;gait training;patient/family education;strengthening;transfer training;progressive activity/exercise;ROM (range of motion);stair training;home program guidelines   Risk & Benefits of therapy have been explained evaluation/treatment results reviewed;care plan/treatment goals reviewed;risks/benefits reviewed;current/potential barriers reviewed;participants included;patient;participants voiced agreement with care plan   PT Total Evaluation Time   PT Eval, Low Complexity Minutes (47806) 10   Physical Therapy Goals   PT Frequency 2x/day   PT Predicted Duration/Target Date for Goal Attainment 02/10/25   PT Goals Transfers;Gait;Aerobic Activity;Stairs;Bed Mobility   PT: Bed Mobility Supervision/stand-by assist;Supine to/from sit;Within precautions   PT: Transfers Sit to/from stand;Bed to/from chair;Assistive device;Supervision/stand-by assist;Within precautions   PT: Gait Assistive  device;Supervision/stand-by assist;Within precautions;100 feet   Interventions   Interventions Quick Adds Gait Training;Therapeutic Activity;Therapeutic Procedure   Therapeutic Activity   Therapeutic Activities: dynamic activities to improve functional performance Minutes (72662) 38   Symptoms Noted During/After Treatment Fatigue;Increased pain   Treatment Detail/Skilled Intervention Patient supine upon arrival.  Reviewed WBAT at left LE.  Donned CAM boot with max A.  Facilitated bed mobility with mod A at left LE, raised bed height while sitting at EOB with improved tolerance.  Facilitated transfer between sitting and standing with 2WW and CGA.  Education on use of walker, facilitated amb 10 feet with 2WW and CGA; patient with NWB observed at left LE, heavy reliance on UE support, limited foot clearance right LE.  Facilitated return to supine with max A at left LE and verbal cueing for technique.  Max A to doff CAM boot, and reposition.  In bed with all needs within reach at end of session.  Increased time spent secondary to fear, pain during all mobility   PT Discharge Planning   PT Plan progress amb distance, trnasfers   PT Discharge Recommendation (DC Rec) home with assist   PT Rationale for DC Rec Anticipate that with ongoing medical management and skilled IP PT, patient will demonstrate functional, household mobility with SBA and least restrictive assistive device.  Will require wheelchair to access home environment (distance from car to apartment greater than 500 feet).   PT Brief overview of current status CGA with 2WW 10 feet   PT Total Distance Amb During Session (feet) 10   PT Equipment Needed at Discharge walker, rolling  (wheelchair-may borrow from friend/family)

## 2025-02-10 ENCOUNTER — APPOINTMENT (OUTPATIENT)
Dept: PHYSICAL THERAPY | Facility: CLINIC | Age: 35
DRG: 494 | End: 2025-02-10
Payer: COMMERCIAL

## 2025-02-10 PROCEDURE — 250N000013 HC RX MED GY IP 250 OP 250 PS 637

## 2025-02-10 PROCEDURE — 99232 SBSQ HOSP IP/OBS MODERATE 35: CPT | Performed by: INTERNAL MEDICINE

## 2025-02-10 PROCEDURE — 97530 THERAPEUTIC ACTIVITIES: CPT | Mod: GP | Performed by: PHYSICAL THERAPIST

## 2025-02-10 PROCEDURE — 97116 GAIT TRAINING THERAPY: CPT | Mod: GP | Performed by: PHYSICAL THERAPIST

## 2025-02-10 PROCEDURE — 250N000013 HC RX MED GY IP 250 OP 250 PS 637: Performed by: INTERNAL MEDICINE

## 2025-02-10 PROCEDURE — 250N000011 HC RX IP 250 OP 636: Mod: JZ | Performed by: INTERNAL MEDICINE

## 2025-02-10 PROCEDURE — 120N000001 HC R&B MED SURG/OB

## 2025-02-10 PROCEDURE — 250N000013 HC RX MED GY IP 250 OP 250 PS 637: Performed by: STUDENT IN AN ORGANIZED HEALTH CARE EDUCATION/TRAINING PROGRAM

## 2025-02-10 PROCEDURE — 250N000013 HC RX MED GY IP 250 OP 250 PS 637: Performed by: PHYSICIAN ASSISTANT

## 2025-02-10 PROCEDURE — 99254 IP/OBS CNSLTJ NEW/EST MOD 60: CPT | Performed by: PHYSICIAN ASSISTANT

## 2025-02-10 RX ORDER — HYDROMORPHONE HYDROCHLORIDE 1 MG/ML
0.3 INJECTION, SOLUTION INTRAMUSCULAR; INTRAVENOUS; SUBCUTANEOUS EVERY 4 HOURS PRN
Status: DISCONTINUED | OUTPATIENT
Start: 2025-02-10 | End: 2025-02-11 | Stop reason: HOSPADM

## 2025-02-10 RX ORDER — HYDROXYZINE HYDROCHLORIDE 25 MG/1
25 TABLET, FILM COATED ORAL EVERY 6 HOURS PRN
Qty: 30 TABLET | Refills: 0 | Status: SHIPPED | OUTPATIENT
Start: 2025-02-10

## 2025-02-10 RX ORDER — METHOCARBAMOL 750 MG/1
750 TABLET, FILM COATED ORAL 4 TIMES DAILY PRN
Qty: 30 TABLET | Refills: 0 | Status: SHIPPED | OUTPATIENT
Start: 2025-02-10

## 2025-02-10 RX ORDER — HYDROMORPHONE HYDROCHLORIDE 2 MG/1
2-4 TABLET ORAL
Qty: 40 TABLET | Refills: 0 | Status: SHIPPED | OUTPATIENT
Start: 2025-02-10 | End: 2025-02-11

## 2025-02-10 RX ORDER — OXYCODONE HYDROCHLORIDE 5 MG/1
5-10 TABLET ORAL
Status: DISCONTINUED | OUTPATIENT
Start: 2025-02-10 | End: 2025-02-11 | Stop reason: HOSPADM

## 2025-02-10 RX ADMIN — ASPIRIN 81 MG: 81 TABLET, COATED ORAL at 11:02

## 2025-02-10 RX ADMIN — HYDROMORPHONE HYDROCHLORIDE 0.6 MG: 0.2 INJECTION, SOLUTION INTRAMUSCULAR; INTRAVENOUS; SUBCUTANEOUS at 09:27

## 2025-02-10 RX ADMIN — PANTOPRAZOLE SODIUM 40 MG: 40 TABLET, DELAYED RELEASE ORAL at 11:02

## 2025-02-10 RX ADMIN — HYDROXYZINE HYDROCHLORIDE 25 MG: 25 TABLET ORAL at 00:51

## 2025-02-10 RX ADMIN — POLYETHYLENE GLYCOL 3350 17 G: 17 POWDER, FOR SOLUTION ORAL at 11:02

## 2025-02-10 RX ADMIN — OXYCODONE HYDROCHLORIDE 10 MG: 5 TABLET ORAL at 18:58

## 2025-02-10 RX ADMIN — ASPIRIN 81 MG: 81 TABLET, COATED ORAL at 19:44

## 2025-02-10 RX ADMIN — ACETAMINOPHEN 650 MG: 325 TABLET, FILM COATED ORAL at 00:51

## 2025-02-10 RX ADMIN — OXYCODONE HYDROCHLORIDE 10 MG: 5 TABLET ORAL at 14:15

## 2025-02-10 RX ADMIN — ACETAMINOPHEN 650 MG: 325 TABLET, FILM COATED ORAL at 08:28

## 2025-02-10 RX ADMIN — HYDROMORPHONE HYDROCHLORIDE 4 MG: 2 TABLET ORAL at 08:28

## 2025-02-10 RX ADMIN — METHOCARBAMOL 750 MG: 750 TABLET ORAL at 11:02

## 2025-02-10 RX ADMIN — HYDROMORPHONE HYDROCHLORIDE 4 MG: 2 TABLET ORAL at 00:51

## 2025-02-10 RX ADMIN — METHOCARBAMOL 750 MG: 750 TABLET ORAL at 17:12

## 2025-02-10 RX ADMIN — ACETAMINOPHEN 650 MG: 325 TABLET, FILM COATED ORAL at 17:12

## 2025-02-10 RX ADMIN — HYDROXYZINE HYDROCHLORIDE 25 MG: 25 TABLET ORAL at 14:15

## 2025-02-10 ASSESSMENT — ACTIVITIES OF DAILY LIVING (ADL)
ADLS_ACUITY_SCORE: 31
ADLS_ACUITY_SCORE: 31
ADLS_ACUITY_SCORE: 29
ADLS_ACUITY_SCORE: 31
ADLS_ACUITY_SCORE: 31
ADLS_ACUITY_SCORE: 29
ADLS_ACUITY_SCORE: 31
ADLS_ACUITY_SCORE: 29
ADLS_ACUITY_SCORE: 31
ADLS_ACUITY_SCORE: 31
ADLS_ACUITY_SCORE: 29
ADLS_ACUITY_SCORE: 31
ADLS_ACUITY_SCORE: 29
ADLS_ACUITY_SCORE: 31

## 2025-02-10 NOTE — PLAN OF CARE
"Goal Outcome Evaluation:      Plan of Care Reviewed With: patient    Overall Patient Progress: improvingOverall Patient Progress: improving    Outcome Evaluation: Pt AOx4. Ax1 walker/GB to BSC; WBAT LLE. RA. Pain managed with PO dilaudid, tylenol, robaxin and atarax. Breast pump at bedside. No BM since sugery. Discharge TBD.      Problem: Adult Inpatient Plan of Care  Goal: Plan of Care Review  Description: The Plan of Care Review/Shift note should be completed every shift.  The Outcome Evaluation is a brief statement about your assessment that the patient is improving, declining, or no change.  This information will be displayed automatically on your shift  note.  Outcome: Progressing  Flowsheets (Taken 2/10/2025 0419)  Outcome Evaluation:   Pt AOx4. Ax1 walker/GB to BSC   WBAT LLE. RA. Pain managed with PO dilaudid, tylenol, robaxin and atarax. Breast pump at bedside. No BM since sugery. Discharge TBD.  Plan of Care Reviewed With: patient  Overall Patient Progress: improving  Goal: Patient-Specific Goal (Individualized)  Description: You can add care plan individualizations to a care plan. Examples of Individualization might be:  \"Parent requests to be called daily at 9am for status\", \"I have a hard time hearing out of my right ear\", or \"Do not touch me to wake me up as it startles  me\".  Outcome: Progressing  Goal: Absence of Hospital-Acquired Illness or Injury  Outcome: Progressing  Intervention: Identify and Manage Fall Risk  Recent Flowsheet Documentation  Taken 2/10/2025 0100 by Taina Barnett, RN  Safety Promotion/Fall Prevention:   activity supervised   assistive device/personal items within reach   clutter free environment maintained   mobility aid in reach   nonskid shoes/slippers when out of bed   room near nurse's station   safety round/check completed  Intervention: Prevent Skin Injury  Recent Flowsheet Documentation  Taken 2/10/2025 0100 by Taina Barnett, RN  Body Position: supine, " head elevated  Skin Protection: adhesive use limited  Intervention: Prevent and Manage VTE (Venous Thromboembolism) Risk  Recent Flowsheet Documentation  Taken 2/10/2025 0100 by Taina Barnett RN  VTE Prevention/Management: SCDs off (sequential compression devices)  Intervention: Prevent Infection  Recent Flowsheet Documentation  Taken 2/10/2025 0100 by Taina Barnett RN  Infection Prevention:   single patient room provided   rest/sleep promoted   hand hygiene promoted  Goal: Optimal Comfort and Wellbeing  Outcome: Progressing  Goal: Readiness for Transition of Care  Outcome: Progressing     Problem: Surgery Nonspecified  Goal: Absence of Bleeding  Outcome: Progressing  Intervention: Monitor and Manage Bleeding  Recent Flowsheet Documentation  Taken 2/10/2025 0100 by Taina Barnett RN  Bleeding Management: dressing monitored  Goal: Effective Bowel Elimination  Outcome: Progressing  Intervention: Enhance Bowel Motility and Elimination  Recent Flowsheet Documentation  Taken 2/10/2025 0100 by Taina Barnett RN  Bowel Motility Enhancement: fluid intake encouraged  Goal: Fluid and Electrolyte Balance  Outcome: Progressing  Goal: Blood Glucose Level Within Targeted Range  Outcome: Progressing  Goal: Absence of Infection Signs and Symptoms  Outcome: Progressing  Intervention: Prevent or Manage Infection  Recent Flowsheet Documentation  Taken 2/10/2025 0100 by Taina Barnett RN  Infection Management: aseptic technique maintained  Goal: Anesthesia/Sedation Recovery  Outcome: Progressing  Intervention: Optimize Anesthesia Recovery  Recent Flowsheet Documentation  Taken 2/10/2025 0100 by Taina Barnett RN  Safety Promotion/Fall Prevention:   activity supervised   assistive device/personal items within reach   clutter free environment maintained   mobility aid in reach   nonskid shoes/slippers when out of bed   room near nurse's station   safety round/check  completed  Goal: Optimal Pain Control and Function  Outcome: Progressing  Goal: Nausea and Vomiting Relief  Outcome: Progressing  Goal: Effective Urinary Elimination  Outcome: Progressing  Intervention: Monitor and Manage Urinary Retention  Recent Flowsheet Documentation  Taken 2/10/2025 0100 by Taina Barnett, RN  Urinary Elimination Promotion:   toileting device within reach   frequent voiding encouraged  Goal: Effective Oxygenation and Ventilation  Outcome: Progressing  Intervention: Optimize Oxygenation and Ventilation  Recent Flowsheet Documentation  Taken 2/10/2025 0100 by Taina Barnett, RN  Cough And Deep Breathing: done with encouragement  Head of Bed (HOB) Positioning: HOB at 20-30 degrees
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
## 2025-02-10 NOTE — CONSULTS
[facility] ACUTE INPATIENT PAIN SERVICE    [facility], [facility], [facility], [facility], [place]   PAIN CONSULT      Assessment/Plan:  Bindu Mcleod is a 34 year old female who was admitted on 2/5/2025.  I was asked by Dr. Valerio Basurto to see the patient for management of her acute post-operative pain. She is currently breast feeding her18 week old baby and has concerns regarding medications. Admitted after a mechanical fall with subsequent left tibial shaft and fibular neck fractures. POD#4 ORIF of her left tibia. History of depression, CHARISMA.      [place] reviewed: has filled #20 Dilaudid 2mg tablets on 02/07.    Opioids Received in the past 24h:  *(4) 4mg Dilaudid tablets  *(2) 2mg Dilaudid tablet  *(2) 0.6mg IV hydromorphone    PLAN:    Pain is consistent with acute post-operative pain. POD#4 left tibia ORIF.    Reviewed case with pain Rph. There is no preferred short acting opiate for breastfeeding. Overall goal is lowest effective MME  Multimodal Medication Therapy:   Adjuvants:   - APAP 650mg q8h + prn  - Methocarbamol 750mg qid prn  - Hydroxyzine 25-50mg q6h prn  - Topical Lidocaine  Opioids:   - Dilaudid 2-4mg q3h prn rotated to Oxycodone 5-10mg q3h prn - first line  - IV hydromorphone 0.6mg q3h prn reduced to 0.3mg q4h prn - second line  Non-medication interventions- Ice  Constipation Prophylaxis- Senna-S  Follow up /Discharge Recommendations - We recommend prescribing the following at the time of discharge:  TBD        Subjective:  Bindu is seen today in her chair alert and oriented in no acute distress. Reports her pain is currently a 8/10 located in her left ankle with radiation towards her lateral thigh.     Reviewed pain medications. Discussed that the goal is to have the lowest overall MME and that there is no preference with short acting opiates for breast feeding. She would like to trial oxycodone for today. Pain team will continue to follow.     Denies nausea, vomiting, constipation.     "  Reviewed continuing with current plan, all questions answered.          Visit/Communication Style   Virtual (Video) communication was used to evaluate Bindu.  Bindu consented to the use of video communication.  Video START time: 1136, 2/10/2025  Video STOP time: 1159, 2/10/2025   Patient's location: [facility]THO [facility]  Provider's location during the visit: [facility]      History   Drug Use No         Tobacco Use      Smoking status: Former      Smokeless tobacco: None        Objective:  Vital signs in last 24 hours:  B/P: 106/39, T: 98, P: 77, R: 16   Blood pressure (!) 106/39, pulse 77, temperature 98  F (36.7  C), temperature source Temporal, resp. rate (P) 16, height 1.6 m (5' 3\"), weight 68.7 kg (151 lb 7.3 oz), SpO2 98%, currently breastfeeding.        Review of Systems:   As per subjective, all others negative.    Physical Exam    General: in no apparent distress and non-toxic   HEENT: Head normocephalic atraumatic, oral mucosa moist. Sclerae anicteric  Resp: Unlabored breathing on video  Skin: No rashes or lesions on head and neck  Psych: Normal affect, mood euthymic  Neuro: Grossly normal          Imaging:  Personally Reviewed.    Results for orders placed or performed during the hospital encounter of 02/05/25   XR Tibia and Fibula Left 2 Views    Impression    IMPRESSION: Mildly displaced obliquely oriented distal left tibial shaft and fibular neck fractures. Joint spaces are preserved.        Lab Results:  Personally Reviewed.   Last Comprehensive Metabolic Panel:  Sodium   Date Value Ref Range Status   02/06/2025 138 135 - 145 mmol/L Final   11/14/2019 137 133 - 144 mmol/L Final     Potassium   Date Value Ref Range Status   02/06/2025 4.1 3.4 - 5.3 mmol/L Final   11/14/2019 3.6 3.4 - 5.3 mmol/L Final     Chloride   Date Value Ref Range Status   02/06/2025 101 98 - 107 mmol/L Final   11/14/2019 105 94 - 109 mmol/L Final     Carbon Dioxide   Date Value Ref Range Status "   11/14/2019 29 20 - 32 mmol/L Final     Carbon Dioxide (CO2)   Date Value Ref Range Status   02/06/2025 24 22 - 29 mmol/L Final     Anion Gap   Date Value Ref Range Status   02/06/2025 13 7 - 15 mmol/L Final   11/14/2019 3 3 - 14 mmol/L Final     Glucose   Date Value Ref Range Status   02/08/2025 97 70 - 99 mg/dL Final   11/14/2019 120 (H) 70 - 99 mg/dL Final     Urea Nitrogen   Date Value Ref Range Status   02/06/2025 11.5 6.0 - 20.0 mg/dL Final   11/14/2019 11 7 - 30 mg/dL Final     Creatinine   Date Value Ref Range Status   02/06/2025 0.61 0.51 - 0.95 mg/dL Final   11/14/2019 0.81 0.52 - 1.04 mg/dL Final     GFR Estimate   Date Value Ref Range Status   02/06/2025 >90 >60 mL/min/1.73m2 Final     Comment:     eGFR calculated using 2021 CKD-EPI equation.   11/14/2019 >90 >60 mL/min/[1.73_m2] Final     Comment:     Non  GFR Calc  Starting 12/18/2018, serum creatinine based estimated GFR (eGFR) will be   calculated using the Chronic Kidney Disease Epidemiology Collaboration   (CKD-EPI) equation.       Calcium   Date Value Ref Range Status   02/06/2025 8.8 8.8 - 10.4 mg/dL Final   11/14/2019 9.6 8.5 - 10.1 mg/dL Final        UA:   Amphetamine Qual Urine   Date Value Ref Range Status   03/04/2010 Negative NEG Final     Comment:      Cutoff for a negative amphetamine is 500 ng/mL or less.     Barbiturates Qual Urine   Date Value Ref Range Status   03/04/2010 Negative NEG Final     Comment:      Cutoff for a negative barbiturate is 200 ng/mL or less.     Benzodiazepine Qual Urine   Date Value Ref Range Status   03/04/2010 Negative NEG Final     Comment:      Cutoff for a negative benzodiazepine is 200 ng/mL or less.     Cannabinoids Qual Urine   Date Value Ref Range Status   03/04/2010 Negative NEG Final     Comment:      Cutoff for a negative cannabinoid is 50 ng/mL or less.     Cocaine Qual Urine   Date Value Ref Range Status   03/04/2010 Negative NEG Final     Comment:      Cutoff for a negative  cocaine is 300 ng/mL or less.     Opiates Qualitative Urine   Date Value Ref Range Status   03/04/2010 Negative NEG Final     Comment:      Cutoff for a negative opiate is 300 ng/mL or less.     PCP Qual Urine   Date Value Ref Range Status   03/04/2010 Negative NEG Final     Comment:      Cutoff for a negative PCP is 25 ng/mL or less.              Please see A&P for additional details of medical decision making.      Margarito Tan PA-C  Acute Care Pain Management  Team  Hours of pain coverage Mon-Fri 8-1600, afterhours please call the primary team   [facility] (ELAINE, [place], [place], RH)   Page via Vocera text web console -Click for Styky

## 2025-02-10 NOTE — PROGRESS NOTES
Orthopedic Surgery  Bindu Mcleod  02/10/2025     Admit Date:  2/5/2025  Assessment:   POD: 4 Days Post-Op   Procedure(s):  Intramedullary nail fixation left tibia fracture Nonoperative care left fibular neck fracture without manipulation     Patient resting in bed, no acute distress, reports difficulty sleeping last night due to pain  Pain controlled with oral medication  Tolerating oral intake.    Denies nausea or vomiting  Denies chest pain or shortness of breath  Working with PT/OT and feels she is making progress    Temp:  [98  F (36.7  C)] 98  F (36.7  C)  Pulse:  [77-85] 77  Resp:  [16-18] 18  BP: (106-110)/(39-46) 106/39  SpO2:  [98 %] 98 %    Alert and oriented  Dressing is clean, dry, and intact.   Minimal erythema of the surrounding skin.   Bilateral calves are soft to palpation  Left lower extremity is NVI.  Sensation intact bilateral lower extremities  +Dp pulse  Patient is able to initiate lifting the leg, but is limited due to weakness    Labs:  Recent Labs   Lab Test 02/08/25  0617 02/07/25  0732 02/06/25  0801 06/26/23  0510 02/20/23  1445 11/14/19  0159   WBC  --   --  9.1  --  7.6 8.0   HGB 10.7* 12.2 14.3   < > 12.2 15.1   PLT  --   --  197  --  222 274    < > = values in this interval not displayed.           Plan:   Continue mechanical SCD's, aspirin 81 mg BID x 6 weeks for DVT prophylaxis.     Mobilize with PT/OT, gait aids    WBAT LLE  Ice, elevate, CAM boot only when required for mobilizing.  Blue wedge foam for elevation.  CAM boot can be removed when resting and in bed     Continue current pain regiment   Dressings: Keep intact.  Change if >60% saturated or peeling off.   Follow-up: 2 weeks post-op with Dr Luke's team for incision check, likely suture removal and full length tib/fib x-rays    Disposition:    Anticipate d/c to home when pain is controlled, medically cleared and progressing in PT, possibly later today.    Katelin Flores PA-C   [facility]  Orthopedics

## 2025-02-11 ENCOUNTER — APPOINTMENT (OUTPATIENT)
Dept: OCCUPATIONAL THERAPY | Facility: CLINIC | Age: 35
DRG: 494 | End: 2025-02-11
Payer: COMMERCIAL

## 2025-02-11 ENCOUNTER — APPOINTMENT (OUTPATIENT)
Dept: PHYSICAL THERAPY | Facility: CLINIC | Age: 35
DRG: 494 | End: 2025-02-11
Payer: COMMERCIAL

## 2025-02-11 VITALS
SYSTOLIC BLOOD PRESSURE: 99 MMHG | HEIGHT: 63 IN | TEMPERATURE: 98.5 F | HEART RATE: 81 BPM | WEIGHT: 151.46 LBS | OXYGEN SATURATION: 98 % | RESPIRATION RATE: 16 BRPM | DIASTOLIC BLOOD PRESSURE: 50 MMHG | BODY MASS INDEX: 26.84 KG/M2

## 2025-02-11 LAB
HGB BLD-MCNC: 10.7 G/DL (ref 11.7–15.7)
HOLD SPECIMEN: NORMAL

## 2025-02-11 PROCEDURE — 250N000013 HC RX MED GY IP 250 OP 250 PS 637: Performed by: INTERNAL MEDICINE

## 2025-02-11 PROCEDURE — 85018 HEMOGLOBIN: CPT | Performed by: INTERNAL MEDICINE

## 2025-02-11 PROCEDURE — 36415 COLL VENOUS BLD VENIPUNCTURE: CPT | Performed by: INTERNAL MEDICINE

## 2025-02-11 PROCEDURE — 99239 HOSP IP/OBS DSCHRG MGMT >30: CPT | Performed by: INTERNAL MEDICINE

## 2025-02-11 PROCEDURE — 250N000013 HC RX MED GY IP 250 OP 250 PS 637: Performed by: PHYSICIAN ASSISTANT

## 2025-02-11 PROCEDURE — 99232 SBSQ HOSP IP/OBS MODERATE 35: CPT | Performed by: PHYSICIAN ASSISTANT

## 2025-02-11 PROCEDURE — 97535 SELF CARE MNGMENT TRAINING: CPT | Mod: GO

## 2025-02-11 PROCEDURE — 97116 GAIT TRAINING THERAPY: CPT | Mod: GP | Performed by: PHYSICAL THERAPIST

## 2025-02-11 PROCEDURE — 250N000013 HC RX MED GY IP 250 OP 250 PS 637: Performed by: STUDENT IN AN ORGANIZED HEALTH CARE EDUCATION/TRAINING PROGRAM

## 2025-02-11 PROCEDURE — 250N000013 HC RX MED GY IP 250 OP 250 PS 637

## 2025-02-11 PROCEDURE — 97530 THERAPEUTIC ACTIVITIES: CPT | Mod: GP | Performed by: PHYSICAL THERAPIST

## 2025-02-11 RX ORDER — OXYCODONE HYDROCHLORIDE 5 MG/1
5-10 TABLET ORAL
Qty: 30 TABLET | Refills: 0 | Status: SHIPPED | OUTPATIENT
Start: 2025-02-11

## 2025-02-11 RX ORDER — ACETAMINOPHEN 325 MG/1
650 TABLET ORAL EVERY 8 HOURS PRN
Qty: 100 TABLET | Refills: 0 | Status: SHIPPED | OUTPATIENT
Start: 2025-02-11

## 2025-02-11 RX ORDER — POLYETHYLENE GLYCOL 3350 17 G/17G
17 POWDER, FOR SOLUTION ORAL DAILY
Qty: 510 G | Refills: 0 | Status: SHIPPED | OUTPATIENT
Start: 2025-02-11

## 2025-02-11 RX ADMIN — POLYETHYLENE GLYCOL 3350 17 G: 17 POWDER, FOR SOLUTION ORAL at 08:19

## 2025-02-11 RX ADMIN — PANTOPRAZOLE SODIUM 40 MG: 40 TABLET, DELAYED RELEASE ORAL at 08:19

## 2025-02-11 RX ADMIN — METHOCARBAMOL 750 MG: 750 TABLET ORAL at 12:13

## 2025-02-11 RX ADMIN — ASPIRIN 81 MG: 81 TABLET, COATED ORAL at 08:19

## 2025-02-11 RX ADMIN — OXYCODONE HYDROCHLORIDE 10 MG: 5 TABLET ORAL at 08:19

## 2025-02-11 RX ADMIN — HYDROXYZINE HYDROCHLORIDE 25 MG: 25 TABLET ORAL at 00:44

## 2025-02-11 RX ADMIN — OXYCODONE HYDROCHLORIDE 10 MG: 5 TABLET ORAL at 12:13

## 2025-02-11 RX ADMIN — HYDROXYZINE HYDROCHLORIDE 25 MG: 25 TABLET ORAL at 08:19

## 2025-02-11 RX ADMIN — OXYCODONE HYDROCHLORIDE 5 MG: 5 TABLET ORAL at 00:44

## 2025-02-11 RX ADMIN — ACETAMINOPHEN 650 MG: 325 TABLET, FILM COATED ORAL at 08:19

## 2025-02-11 RX ADMIN — ACETAMINOPHEN 650 MG: 325 TABLET, FILM COATED ORAL at 00:44

## 2025-02-11 ASSESSMENT — ACTIVITIES OF DAILY LIVING (ADL)
ADLS_ACUITY_SCORE: 29
ADLS_ACUITY_SCORE: 29
ADLS_ACUITY_SCORE: 39
ADLS_ACUITY_SCORE: 29
ADLS_ACUITY_SCORE: 39
ADLS_ACUITY_SCORE: 29
ADLS_ACUITY_SCORE: 29
ADLS_ACUITY_SCORE: 39

## 2025-02-11 NOTE — PLAN OF CARE
Physical Therapy Discharge Summary    Reason for therapy discharge:    All goals and outcomes met, no further needs identified.    Progress towards therapy goal(s). See goals on Care Plan in [facility] electronic health record for goal details.  Goals met    Therapy recommendation(s):    Patient has demonstrated multiple days of functional, household mobility with SBA and FWW, navigating >200 ft. Able to manage CAM boot. Will require wheelchair to access home environment (distance from car to apartment greater than 500 feet).

## 2025-02-11 NOTE — DISCHARGE SUMMARY
"[facility]  Hospitalist Discharge Summary      Date of Admission:  2/5/2025  Date of Discharge:  2/11/2025  Discharging Provider: Valerio Joyce MD  Discharge Service: Hospitalist Service    Discharge Diagnoses   Displaced Left Tibial Shaft & Fibular Neck Fractures  Mechanical Ground-Level Fall  MDD -depression  Generalized anxiety disorder  Postop anemia    Clinically Significant Risk Factors     # Overweight: Estimated body mass index is 26.83 kg/m  as calculated from the following:    Height as of this encounter: 1.6 m (5' 3\").    Weight as of this encounter: 68.7 kg (151 lb 7.3 oz).       Follow-ups Needed After Discharge   Follow-up Appointments       Follow Up Care      Follow-up with your Surgeon Team in 2 weeks for wound check.        Follow-up and recommended labs and tests       Follow up with primary care provider, Cara BROCK, within 7 days for hospital follow- up.  The following labs/tests are recommended: BMP, CBC.                Discharge Disposition   Discharged to home  Condition at discharge: Good    Hospital Course     Bindu Mcleod is a 34 year old lady with history of depression, generalized anxiety disorder came to [facility] 2/5/2025 for  ground-level fall with left leg pain with x-ray of the left tibia and fibula showing Mildly displaced obliquely oriented distal left tibial shaft and fibular neck fractures for which orthopedic surgery was consulted and patient underwent intramedullary nail fixation of the left tibial fracture and nonoperative care of the left fibular neck fracture without manipulation by Dr. Chase Luke DO on 2/6/2025 pain management after surgery was an issue because of which pain team was consulted orthopedic surgery reviewed the discharge pain medications and patient agrees to going home today.  I told her that apart from this medication she can also take ibuprofen along with omeprazole for pain relief    Consultations " "This Hospital Stay   ORTHOPEDIC SURGERY IP CONSULT  PHYSICAL THERAPY ADULT IP CONSULT  OCCUPATIONAL THERAPY ADULT IP CONSULT  SPIRITUAL HEALTH SERVICES IP CONSULT  PAIN MANAGEMENT ADULT IP CONSULT    Code Status   Full Code    Time Spent on this Encounter   I, Valerio Joyce MD, personally saw the patient today and spent greater than 30 minutes discharging this patient.       Valerio Joyce MD  [facility] ORTHO SPINE  [address]  [facility] [address]  Phone: [phone]  Fax: [phone]  ______________________________________________________________________    Physical Exam   Vital Signs: Temp: 98.5  F (36.9  C) Temp src: Temporal BP: 99/50 Pulse: 81   Resp: 16 SpO2: 98 % O2 Device: None (Room air)    Weight: 151 lbs 7.3 oz  GENERAL: Patient is not in acute distress  HEENT: EOM+,Conjunctiva is clear   NECK:  no Jugular Venous distention  HEART: S1 S2 regular Rate and Rhythm, there is  no murmur,   LUNGS: Respirations are  not laboured, Lungs are  clear to auscultation without Crepitations or Wheezing   ABDOMEN: Soft , there is no tenderness , Bowel Sounds are  Positive   LOWER LIMBS: Left lower incision site was not seen  CNS:  Alert,  Oriented x 3, Moving all the Four Limbs        Primary Care Physician   Cara BROCK    Discharge Orders      Reason for your hospital stay    ORIF left tibia fracture with IM nail, nonoperative management fibula neck fracture     When to call - Contact Surgeon Team    You may experience symptoms that require follow-up before your scheduled appointment. Refer to the \"Stoplight Tool\" for instructions on when to contact your Surgeon Team if you are concerned about pain control, blood clots, constipation, or if you are unable to urinate.     When to call - Reach out to Urgent Care    If you are not able to reach your Surgeon Team and you need immediate care, go to the Orthopedic Walk-in Clinic or Urgent Care at your Surgeon's office.  Do NOT go to the " Emergency Room unless you have shortness of breath, chest pain, or other signs of a medical emergency.     When to call - Reasons to Call 911    Call 911 immediately if you experience sudden-onset chest pain, arm weakness/numbness, slurred speech, or shortness of breath     Discharge Instruction - Breathing exercises    Perform breathing exercises 10 times per hours while awake for 2 weeks. (If given, use your Incentive Spirometer)     Symptoms - Fever Management    A low grade fever can be expected after surgery.  Use acetaminophen (TYLENOL) as needed for fever management.  Contact your Surgeon Team if you have a fever greater than 101.5 F, chills, and/or night sweats.     Symptoms - Constipation management    Constipation (hard, dry bowel movements) is expected after surgery due to the combination of being less active, the anesthetic, and the opioid pain medication.  You can do the following to help reduce constipation:  ~  FLUIDS:  Drink clear liquids (water or Gatorade), or juice (apple/prune).  ~  DIET:  Eat a fiber rich diet.    ~  ACTIVITY:  Get up and move around several times a day.  Increase your activity as you are able.  MEDICATIONS:  Reduce the risk of constipation by starting medications before you are constipated.  You can take Miralax   (1 packet as directed) and/or a stool softener (Senokot 1-2 tablets 1-2 times a day).  If you already have constipation and these medications are not working, you can get magnesium citrate and use as directed.  If you continue to have constipation you can try an over the counter suppository or enema.  Call your Surgeon Team if it has been greater than 3 days since your last bowel movement.     Symptoms - Reduced Urine Output    Changes in the amount of fluids you drank before and after surgery may result in problems urinating.  It is important to stay well-hydrated after surgery and drink plenty of water. If it has been greater than 8 hours since you have urinated  despite drinking plenty of water, call your Surgeon Team.     Activity - Exercises to prevent blood clots    Unless otherwise directed by your Surgeon team, perform the following exercises at least three times per day for the first four weeks after surgery to prevent blood clots in your legs: 1) Point and flex your feet (Ankle Pumps), 2) Move your ankle around in big circles, 3) Wiggle your toes, 4) Walk, even for short distances, several times a day, will help decrease the risk of blood clots.     Comfort and Pain Management - Pain after Surgery    Pain after surgery is normal and expected.  You will have some amount of pain for several weeks after surgery.  Your pain will improve with time.  There are several things you can do to help reduce your pain including: rest, ice, elevation, and using pain medications as needed. Contact your Surgeon Team if you have pain that persists or worsens after surgery despite rest, ice, elevation, and taking your medication(s) as prescribed. Contact your Surgeon Team if you have new numbness, tingling, or weakness in your operative extremity.     Comfort and Pain Management - Swelling after Surgery    Swelling and/or bruising of the surgical extremity is common and may persist for several months after surgery. In addition to frequent icing and elevation, gentle compressive support with an ACE wrap or tubigrip may help with swelling. Apply compression regularly, removing at least twice daily to perform skin checks. Contact your Surgeon Team if your swelling increases and is NOT associated with an increase in your activity level, or if your swelling increases and is associated with redness and pain.     Comfort and Pain Management - Cold therapy    Ice can be used to control swelling and discomfort after surgery. Place a thin towel over your operative site and apply the ice pack overtop. Leave ice pack in place for 20 minutes, then remove for 20 minutes. Repeat this 20 minutes on/20  "minutes off routine as often as tolerated.     Medication Instructions - Acetaminophen (TYLENOL) Instructions    You were discharged with acetaminophen (TYLENOL) for pain management after surgery. Acetaminophen most effectively manages pain symptoms when it is taken on a schedule without missing doses (every four, six, or eight hours). Your Provider will prescribe a safe daily dose between 3000 - 4000 mg.  Do NOT exceed this daily dose. Most patients use acetaminophen for pain control for the first four weeks after surgery.  You can wean from this medication as your pain decreases.     Medication Instructions - NSAID Instructions    You were discharged with an anti-inflammatory medication for pain management to use in combination with acetaminophen (TYLENOL) and the narcotic pain medication.  Take this medication exactly as directed.  You should only take one anti-inflammatory at a time.  Some common anti-inflammatories include: ibuprofen (ADVIL, MOTRIN), naproxen (ALEVE, NAPROSYN), celecoxib (CELEBREX), meloxicam (MOBIC), ketorolac (TORADOL).  Take this medication with food and water.     Follow Up Care    Follow-up with your Surgeon Team in 2 weeks for wound check.     Medication instructions -  Anticoagulation - aspirin    Take the aspirin as prescribed for a total of four weeks after surgery.  This is given to help minimize your risk of blood clot.     Comfort and Pain Management - LOWER Extremity Elevation    Swelling is expected for several months after surgery. This type of swelling is usually associated with gravity and activity, and can be improved with elevation.   The best way to do this is to get your \"toes above your nose\" by laying down and placing several pillows lengthwise under your calf and heel. When elevating your leg keep your knee completely straight. Perform this elevation as often as possible especially for the first two weeks after surgery.     Opioid Instructions (Less than 65 years)    You " were discharged with an opioid medication (hydromorphone, oxycodone, hydrocodone, or tramadol). This medication should only be taken for breakthrough pain that is not controlled with acetaminophen (TYLENOL). If you rate your pain less than 3 you do not need this medication. Pain rating 0-3: You do not need this medication. Pain rating 4-6: Take 1 tablet every 4-6 hours as needed Pain rating 7-10: Take 2 tablets every 4-6 hours as needed. Do not exceed 6 tablets per day     Medication Instructions - Opioids - Tapering Instructions    In the first three days following surgery, your symptoms may warrant use of the narcotic pain medication every four to six hours as prescribed. This is normal. As your pain symptoms improve, focus your efforts on decreasing (tapering) use of narcotic medications. The most successful tapering strategy is to first, decrease the number of tablets you take every 4-6 hours to the minimum prescribed. Then, increase the amount of time between doses. For example: First, taper to   or 1 tablet every 4-6 hours. Then, taper to   or 1 tablet every 6-8 hours. Then, taper to   or 1 tablet every 8-10 hours. Then, taper to   or 1 tablet every 10-12 hours. Then, taper to   or 1 tablet at bedtime. The bedtime dose can help with comfort during sleep and is typically the last dose to be discontinued after surgery.     Return to Driving    Return to driving - Driving is NOT permitted until directed by your provider. Under no circumstance are you permitted to drive while using narcotic pain medications.     Dressing / Wound Care - Wound    You have a clean dressing on your surgical wound. Dressing change instructions as follows: dressing will be removed at your follow-up appointment. Contact your Surgeon Team if you have increased redness, warmth around the surgical wound, and/or drainage from the surgical wound.     Dressing / Wound Care - NO Tub Bathing    Tub bathing, swimming, or any other activities that  will cause your incision to be submerged in water should be avoided until allowed by your Surgeon.     Active Range of Motions    Move hip/knee/ankle as able     Weight bearing as tolerated    Weight bearing as tolerated on your operative extremity with gait aids, boot on     Ankle Foot Orthotic (AFO) or Cam-Walker    Remove for twice daily for skin assessment and hygiene.  Wear AFO/CAM boot at all times except for skin assessment and hygiene. When removing, ensure that you are safely seated or lying in bed.     Dressing / Wound care - Shower with wound/dressing covered    You must COVER your dressing or incision with saran wrap (or any other non-permeable covering) to allow the incision to remain dry while showering.  You may shower 3 days after surgery as long as the surgical wound stays dry. Continue to cover your dressing or incision for showering until your first office visit.  You are strictly prohibited from soaking or submerging the surgical wound underwater.     Reason for your hospital stay    came to [facility] 2/5/2025 for  ground-level fall with left leg pain with x-ray of the left tibia and fibula showing Mildly displaced obliquely oriented distal left tibial shaft and fibular neck fractures     Follow-up and recommended labs and tests     Follow up with primary care provider, Cara BROCK, within 7 days for hospital follow- up.  The following labs/tests are recommended: BMP, CBC.     Activity    Your activity upon discharge: activity as tolerated     Crutches DME    DME Documentation: Describe the reason for need to support medical necessity: Impaired gait status post knee surgery. I, the undersigned, certify that the above prescribed supplies are medically necessary for this patient and is both reasonable and necessary in reference to accepted standards of medical practice in the treatment of this patient's condition and is not prescribed as a convenience.     Cane DME    DME  Documentation: Describe the reason for need to support medical necessity: Impaired gait status post knee surgery. I, the undersigned, certify that the above prescribed supplies are medically necessary for this patient and is both reasonable and necessary in reference to accepted standards of medical practice in the treatment of this patient's condition and is not prescribed as a convenience.     Walker DME    DME Documentation: Describe the reason for need to support medical necessity: Impaired gait status post knee surgery. I, the undersigned, certify that the above prescribed supplies are medically necessary for this patient and is both reasonable and necessary in reference to accepted standards of medical practice in the treatment of this patient's condition and is not prescribed as a convenience.     Discharge Instruction - Regular Diet Adult    Return to your pre-surgery diet unless instructed otherwise     Diet    Follow this diet upon discharge: Current Diet:Orders Placed This Encounter      Advance Diet as Tolerated: Regular Diet Adult      Discharge Instruction - Regular Diet Adult       Significant Results and Procedures   Most Recent 3 CBC's:  Recent Labs   Lab Test 02/11/25  0614 02/08/25  0617 02/07/25  0732 02/06/25  0801 06/26/23  0510 02/20/23  1445 11/14/19  0159   WBC  --   --   --  9.1  --  7.6 8.0   HGB 10.7* 10.7* 12.2 14.3   < > 12.2 15.1   MCV  --   --   --  91  --  96 91   PLT  --   --   --  197  --  222 274    < > = values in this interval not displayed.     Most Recent 3 BMP's:  Recent Labs   Lab Test 02/08/25  0617 02/07/25  0732 02/06/25  0801 02/20/23  1445 02/20/23  1445 11/14/19  0159   NA  --   --  138  --  138 137   POTASSIUM  --   --  4.1  --  4.4 3.6   CHLORIDE  --   --  101  --  103 105   CO2  --   --  24  --  26 29   BUN  --   --  11.5  --  9.5 11   CR  --   --  0.61  --  0.63 0.81   ANIONGAP  --   --  13  --  9 3   WISAM  --   --  8.8  --  8.9 9.6   GLC 97 103* 96   < > 98 120*     < > = values in this interval not displayed.     Most Recent 2 LFT's:  Recent Labs   Lab Test 02/20/23  1445 11/14/19  0159   AST 14 11   ALT 18 13   ALKPHOS 50 49   BILITOTAL <0.2 0.5   ,   Results for orders placed or performed during the hospital encounter of 02/05/25   XR Tibia and Fibula Left 2 Views    Narrative    EXAM: XR TIBIA AND FIBULA LEFT 2 VIEWS  LOCATION: [facility]  DATE: 2/5/2025    INDICATION: fall  COMPARISON: None.      Impression    IMPRESSION: Mildly displaced obliquely oriented distal left tibial shaft and fibular neck fractures. Joint spaces are preserved.   XR Surgery FRANCIS L/T 5 Min Fluoro w Stills    Narrative    This exam was marked as non-reportable because it will not be read by a   radiologist or a [facility] non-radiologist provider.             Discharge Medications   Current Discharge Medication List        START taking these medications    Details   acetaminophen (TYLENOL) 325 MG tablet Take 2 tablets (650 mg) by mouth every 8 hours as needed for pain.  Qty: 100 tablet, Refills: 0    Associated Diagnoses: Tibia/fibula fracture, left, closed, initial encounter      aspirin 81 MG EC tablet Take 1 tablet (81 mg) by mouth 2 times daily.  Qty: 60 tablet, Refills: 0    Associated Diagnoses: Tibia/fibula fracture, left, closed, initial encounter      hydrOXYzine HCl (ATARAX) 25 MG tablet Take 1 tablet (25 mg) by mouth every 6 hours as needed for other (adjuvant pain).  Qty: 30 tablet, Refills: 0    Associated Diagnoses: Tibia/fibula fracture, left, closed, initial encounter      methocarbamol (ROBAXIN) 750 MG tablet Take 1 tablet (750 mg) by mouth 4 times daily as needed for muscle spasms or other (pain).  Qty: 30 tablet, Refills: 0    Associated Diagnoses: Tibia/fibula fracture, left, closed, initial encounter      oxyCODONE (ROXICODONE) 5 MG tablet Take 1-2 tablets (5-10 mg) by mouth every 3 hours as needed for moderate pain.  Qty: 30 tablet, Refills: 0    Associated  Diagnoses: Tibia/fibula fracture, left, closed, initial encounter      polyethylene glycol (MIRALAX) 17 GM/Dose powder Take 17 g by mouth daily.  Qty: 510 g, Refills: 0    Associated Diagnoses: Tibia/fibula fracture, left, closed, initial encounter      senna-docusate (SENOKOT-S/PERICOLACE) 8.6-50 MG tablet Take 1 tablet by mouth 2 times daily as needed for constipation.  Qty: 20 tablet, Refills: 1    Associated Diagnoses: Tibia/fibula fracture, left, closed, initial encounter           CONTINUE these medications which have NOT CHANGED    Details   albuterol (PROAIR HFA/PROVENTIL HFA/VENTOLIN HFA) 108 (90 Base) MCG/ACT inhaler Inhale 2 puffs into the lungs every 6 hours as needed for shortness of breath, wheezing or cough  Qty: 18 g, Refills: 0    Comments: Pharmacy may dispense brand covered by insurance (Proair, or proventil or ventolin or generic albuterol inhaler)  Associated Diagnoses: Viral URI with cough           Allergies   Allergies   Allergen Reactions    Azithromycin

## 2025-02-11 NOTE — PROGRESS NOTES
Orthopedic Surgery  Bindu Mcleod  02/11/2025     Admit Date:  2/5/2025  Assessment:   POD: 5 Days Post-Op   Procedure(s):  Intramedullary nail fixation left tibia fracture Nonoperative care left fibular neck fracture without manipulation     Patient resting in bed, reports pain, but is due for pain meds  Pain controlled with oral meds, feels oxycodone is better than dilaudid  Tolerating oral intake    Denies nausea or vomiting  Denies chest pain or shortness of breath    Temp:  [96.9  F (36.1  C)-98.4  F (36.9  C)] 96.9  F (36.1  C)  Pulse:  [80-98] 80  Resp:  [14-16] 16  BP: (100-108)/(43-52) 102/52  SpO2:  [99 %-100 %] 100 %    Alert and oriented  Dressing is clean, dry, and intact.   Bilateral calves are soft, non-tender.  Left lower extremity is NVI.  Sensation intact bilateral lower extremities  Patient able to resist dorsi and plantar flexion bilaterally  +Dp pulse    Labs:  Recent Labs   Lab Test 02/11/25  0614 02/08/25  0617 02/07/25  0732 02/06/25  0801 06/26/23  0510 02/20/23  1445 11/14/19  0159   WBC  --   --   --  9.1  --  7.6 8.0   HGB 10.7* 10.7* 12.2 14.3   < > 12.2 15.1   PLT  --   --   --  197  --  222 274    < > = values in this interval not displayed.         Plan:   Continue mechanical SCD's, aspirin 81 mg BID x 6 weeks for DVT   prophylaxis.     Mobilize with PT/OT, gait aids    WBAT LLE.     Ice, elevate, CAM boot only when required for mobilizing.  Blue wedge foam for  elevation.  CAM boot can be removed when resting and in bed   Continue current pain regiment.  Appreciate input from pain team    Dressings: Keep intact.  Change if >60% saturated or peeling off.    Follow-up: 2 weeks post-op with Dr Luke's team for incision check,   likely suture removal and full length tib/fib x-rays    Disposition:    Anticipate d/c to home when pain is controlled, medically cleared and progressing in PT.    SHERON De La VegaC  [facility]

## 2025-02-11 NOTE — PROGRESS NOTES
[facility] ACUTE INPATIENT PAIN SERVICE    [facility], [facility], [facility], [facility], [place]   PAIN PROGRESS      Assessment/Plan:  Bindu Mcleod is a 34 year old female who was admitted on 2/5/2025.  I was asked by Dr. Valerio Basurto to see the patient for management of her acute post-operative pain. She is currently breast feeding her18 week old baby and has concerns regarding medications. Admitted after a mechanical fall with subsequent left tibial shaft and fibular neck fractures. POD#5 ORIF of her left tibia. History of depression, CHARISMA.       MNPMP reviewed: has filled #20 Dilaudid 2mg tablets on 02/07.     Opioids Received in the past 24h:  *(3) 10mg Dilaudid tablets  *(1) 5mg Dilaudid tablet     PLAN:    Pain is consistent with acute post-operative pain. POD#5 left tibia ORIF.     Reviewed case with pain Rph. There is no preferred short acting opiate for breastfeeding. Overall goal is lowest effective MME  Multimodal Medication Therapy:   Adjuvants:   - APAP 650mg q8h + prn  - Methocarbamol 750mg qid prn  - Hydroxyzine 25-50mg q6h prn  - Topical Lidocaine  Opioids:   - Oxycodone 5-10mg q3h prn - first line  - IV hydromorphone 0.3mg q4h prn - second line  Non-medication interventions- Ice  Constipation Prophylaxis- Senna-S  Follow up /Discharge Recommendations - We recommend prescribing the following at the time of discharge:  #25 Oxycodone 5mg tablets. Follow up with Ortho for further management.           Subjective:  Bindu is seen today alert and oriented in no acute distress. Reports her pain is currently a 8/10 located in her left ankle with radiation towards her lateral thigh.      Medication change yesterday was beneficial. She has not used any IV hydromorphone doses today and believes she will be able to manage her post-op pain at home.     Denies nausea, vomiting, constipation.      Reviewed continuing with current plan, all questions answered.         History   Drug Use No          "Tobacco Use      Smoking status: Former      Smokeless tobacco: None        Objective:  Vital signs in last 24 hours:  B/P: 99/50, T: 98.5, P: 81, R: 16   Blood pressure 99/50, pulse 81, temperature 98.5  F (36.9  C), temperature source Temporal, resp. rate 16, height 1.6 m (5' 3\"), weight 68.7 kg (151 lb 7.3 oz), SpO2 98%, currently breastfeeding.        Review of Systems:   As per subjective, all others negative.    Physical Exam    General: in no apparent distress and non-toxic   HEENT: Head normocephalic atraumatic, oral mucosa moist. Sclerae anicteric  CV: Regular rhythm, normal rate, no murmurs  Resp: No wheezes, no rales or rhonchi, no focal consolidations  GI: Normoactive bowel sounds  Skin: Dressing CDI  Extremities: No peripheral edema  Psych: Normal affect, mood euthymic  Neuro: Grossly normal          Imaging:  Personally Reviewed.    Results for orders placed or performed during the hospital encounter of 02/05/25   XR Tibia and Fibula Left 2 Views    Impression    IMPRESSION: Mildly displaced obliquely oriented distal left tibial shaft and fibular neck fractures. Joint spaces are preserved.        Lab Results:  Personally Reviewed.   Last Comprehensive Metabolic Panel:  Sodium   Date Value Ref Range Status   02/06/2025 138 135 - 145 mmol/L Final   11/14/2019 137 133 - 144 mmol/L Final     Potassium   Date Value Ref Range Status   02/06/2025 4.1 3.4 - 5.3 mmol/L Final   11/14/2019 3.6 3.4 - 5.3 mmol/L Final     Chloride   Date Value Ref Range Status   02/06/2025 101 98 - 107 mmol/L Final   11/14/2019 105 94 - 109 mmol/L Final     Carbon Dioxide   Date Value Ref Range Status   11/14/2019 29 20 - 32 mmol/L Final     Carbon Dioxide (CO2)   Date Value Ref Range Status   02/06/2025 24 22 - 29 mmol/L Final     Anion Gap   Date Value Ref Range Status   02/06/2025 13 7 - 15 mmol/L Final   11/14/2019 3 3 - 14 mmol/L Final     Glucose   Date Value Ref Range Status   02/08/2025 97 70 - 99 mg/dL Final   11/14/2019 " 120 (H) 70 - 99 mg/dL Final     Urea Nitrogen   Date Value Ref Range Status   02/06/2025 11.5 6.0 - 20.0 mg/dL Final   11/14/2019 11 7 - 30 mg/dL Final     Creatinine   Date Value Ref Range Status   02/06/2025 0.61 0.51 - 0.95 mg/dL Final   11/14/2019 0.81 0.52 - 1.04 mg/dL Final     GFR Estimate   Date Value Ref Range Status   02/06/2025 >90 >60 mL/min/1.73m2 Final     Comment:     eGFR calculated using 2021 CKD-EPI equation.   11/14/2019 >90 >60 mL/min/[1.73_m2] Final     Comment:     Non  GFR Calc  Starting 12/18/2018, serum creatinine based estimated GFR (eGFR) will be   calculated using the Chronic Kidney Disease Epidemiology Collaboration   (CKD-EPI) equation.       Calcium   Date Value Ref Range Status   02/06/2025 8.8 8.8 - 10.4 mg/dL Final   11/14/2019 9.6 8.5 - 10.1 mg/dL Final        UA:   Amphetamine Qual Urine   Date Value Ref Range Status   03/04/2010 Negative NEG Final     Comment:      Cutoff for a negative amphetamine is 500 ng/mL or less.     Barbiturates Qual Urine   Date Value Ref Range Status   03/04/2010 Negative NEG Final     Comment:      Cutoff for a negative barbiturate is 200 ng/mL or less.     Benzodiazepine Qual Urine   Date Value Ref Range Status   03/04/2010 Negative NEG Final     Comment:      Cutoff for a negative benzodiazepine is 200 ng/mL or less.     Cannabinoids Qual Urine   Date Value Ref Range Status   03/04/2010 Negative NEG Final     Comment:      Cutoff for a negative cannabinoid is 50 ng/mL or less.     Cocaine Qual Urine   Date Value Ref Range Status   03/04/2010 Negative NEG Final     Comment:      Cutoff for a negative cocaine is 300 ng/mL or less.     Opiates Qualitative Urine   Date Value Ref Range Status   03/04/2010 Negative NEG Final     Comment:      Cutoff for a negative opiate is 300 ng/mL or less.     PCP Qual Urine   Date Value Ref Range Status   03/04/2010 Negative NEG Final     Comment:      Cutoff for a negative PCP is 25 ng/mL or less.           Discussed care with beside RN.     Please see A&P for additional details of medical decision making.      Margarito Tan PA-C  Acute Care Pain Management  Team  Hours of pain coverage Mon-Fri 8-1600, afterhours please call the primary team   [employer] (ELAINE, Hayley, [place], RH)   Page via Vocera text web console -Click for better.

## 2025-02-11 NOTE — PLAN OF CARE
"Afebrile.  Mild anxiety, thoroughly explained pain regimen & POC, encouraged relaxation/rest between cares.  Pain managed with PRN pain meds + cold, Pain Team Consulted.  No nausea.  CMS+.  Drsg CDI.  A1 belt + walker, LLE WBAT & cam boot on when ambulating.  Voiding.  Possibly DC to home tomorrow if pain continue to improve.    Goal Outcome Evaluation:    Plan of Care Reviewed With: patient, family    Overall Patient Progress: improvingOverall Patient Progress: improving    Outcome Evaluation: Pain improving.  Voiding.  Possibly DC home tomorrow.    Problem: Adult Inpatient Plan of Care  Goal: Plan of Care Review  Description: The Plan of Care Review/Shift note should be completed every shift.  The Outcome Evaluation is a brief statement about your assessment that the patient is improving, declining, or no change.  This information will be displayed automatically on your shift  note.  Outcome: Progressing  Flowsheets (Taken 2/10/2025 3248)  Outcome Evaluation: Pain improving.  Voiding.  Possibly DC home tomorrow.  Plan of Care Reviewed With:   patient   family  Overall Patient Progress: improving  Goal: Patient-Specific Goal (Individualized)  Description: You can add care plan individualizations to a care plan. Examples of Individualization might be:  \"Parent requests to be called daily at 9am for status\", \"I have a hard time hearing out of my right ear\", or \"Do not touch me to wake me up as it startles  me\".  Outcome: Progressing  Goal: Absence of Hospital-Acquired Illness or Injury  Outcome: Progressing  Intervention: Identify and Manage Fall Risk  Recent Flowsheet Documentation  Taken 2/10/2025 7914 by Piero Barron, RN  Safety Promotion/Fall Prevention:   activity supervised   assistive device/personal items within reach   clutter free environment maintained   mobility aid in reach   nonskid shoes/slippers when out of bed   safety round/check completed   lighting adjusted   patient and family education   room " organization consistent   supervised activity  Intervention: Prevent Skin Injury  Recent Flowsheet Documentation  Taken 2/10/2025 1712 by Piero Barron RN  Body Position:   position changed independently   legs elevated  Taken 2/10/2025 1320 by Piero Barron RN  Body Position:   position changed independently   legs elevated  Taken 2/10/2025 1200 by Piero Barron RN  Body Position:   position changed independently   legs elevated  Taken 2/10/2025 1102 by Piero Barron RN  Body Position:   position changed independently   legs elevated  Taken 2/10/2025 0915 by Piero Barron RN  Body Position:   position changed independently   legs elevated  Taken 2/10/2025 0828 by Piero Barron RN  Body Position:   supine, head elevated   legs elevated   position changed independently  Skin Protection:   adhesive use limited   incontinence pads utilized   pulse oximeter probe site changed   tubing/devices free from skin contact  Intervention: Prevent and Manage VTE (Venous Thromboembolism) Risk  Recent Flowsheet Documentation  Taken 2/10/2025 0828 by Piero Barron RN  VTE Prevention/Management:   patient refused intervention   SCDs off (sequential compression devices)  Intervention: Prevent Infection  Recent Flowsheet Documentation  Taken 2/10/2025 0828 by Piero Barron RN  Infection Prevention:   hand hygiene promoted   equipment surfaces disinfected  Goal: Optimal Comfort and Wellbeing  Outcome: Progressing  Intervention: Monitor Pain and Promote Comfort  Recent Flowsheet Documentation  Taken 2/10/2025 1800 by Piero Barron RN  Pain Management Interventions: rest  Taken 2/10/2025 1712 by Piero Barron RN  Pain Management Interventions:   medication (see MAR)   care clustered   cold applied   emotional support   diversional activity provided   pillow support provided   quiet environment facilitated   rest   relaxation techniques promoted  Taken 2/10/2025 1500 by Piero Barron RN  Pain Management Interventions:  rest  Taken 2/10/2025 1415 by Piero Barron RN  Pain Management Interventions:   medication (see MAR)   cold applied   care clustered   pillow support provided   rest   relaxation techniques promoted   quiet environment facilitated  Taken 2/10/2025 1200 by Piero Barron RN  Pain Management Interventions: rest  Taken 2/10/2025 1102 by Piero Barron RN  Pain Management Interventions: medication (see MAR)  Taken 2/10/2025 1000 by Piero Barron RN  Pain Management Interventions:   medication offered but refused   rest   quiet environment facilitated   pillow support provided   emotional support   cold applied   care clustered   diversional activity provided   breathing exercises  Taken 2/10/2025 0915 by Piero Barron RN  Pain Management Interventions:   medication offered but refused   rest   pillow support provided   quiet environment facilitated   relaxation techniques promoted   emotional support   cold applied   care clustered   breathing exercises   diversional activity provided  Taken 2/10/2025 0828 by Piero Barron RN  Pain Management Interventions:   pain management plan reviewed with patient/caregiver   medication (see MAR)  Goal: Readiness for Transition of Care  Outcome: Progressing     Problem: Surgery Nonspecified  Goal: Absence of Bleeding  Outcome: Progressing  Intervention: Monitor and Manage Bleeding  Recent Flowsheet Documentation  Taken 2/10/2025 0828 by Piero Barron RN  Bleeding Management:   dressing monitored   affected area elevated  Goal: Effective Bowel Elimination  Outcome: Progressing  Intervention: Enhance Bowel Motility and Elimination  Recent Flowsheet Documentation  Taken 2/10/2025 0828 by Piero Barron RN  Bowel Motility Enhancement:   ambulation promoted   fluid intake encouraged   oral intake encouraged  Goal: Fluid and Electrolyte Balance  Outcome: Progressing  Intervention: Monitor and Manage Fluid and Electrolyte Balance  Recent Flowsheet Documentation  Taken 2/10/2025 0828  by Piero Barron RN  Fluid/Electrolyte Management: fluids provided  Goal: Blood Glucose Level Within Targeted Range  Outcome: Progressing  Goal: Absence of Infection Signs and Symptoms  Outcome: Progressing  Intervention: Prevent or Manage Infection  Recent Flowsheet Documentation  Taken 2/10/2025 0828 by Piero Barron RN  Infection Management: aseptic technique maintained  Goal: Anesthesia/Sedation Recovery  Outcome: Progressing  Intervention: Optimize Anesthesia Recovery  Recent Flowsheet Documentation  Taken 2/10/2025 0828 by Piero Barron RN  Safety Promotion/Fall Prevention:   activity supervised   assistive device/personal items within reach   clutter free environment maintained   mobility aid in reach   nonskid shoes/slippers when out of bed   safety round/check completed   lighting adjusted   patient and family education   room organization consistent   supervised activity  Goal: Optimal Pain Control and Function  Outcome: Progressing  Intervention: Prevent or Manage Pain  Recent Flowsheet Documentation  Taken 2/10/2025 1800 by Piero Barron RN  Pain Management Interventions: rest  Taken 2/10/2025 1712 by Piero Barron RN  Pain Management Interventions:   medication (see MAR)   care clustered   cold applied   emotional support   diversional activity provided   pillow support provided   quiet environment facilitated   rest   relaxation techniques promoted  Taken 2/10/2025 1500 by Piero Barron RN  Pain Management Interventions: rest  Taken 2/10/2025 1415 by Piero Barron RN  Pain Management Interventions:   medication (see MAR)   cold applied   care clustered   pillow support provided   rest   relaxation techniques promoted   quiet environment facilitated  Taken 2/10/2025 1200 by Piero Barron RN  Pain Management Interventions: rest  Taken 2/10/2025 1102 by Piero Barron RN  Pain Management Interventions: medication (see MAR)  Taken 2/10/2025 1000 by Piero Barron RN  Pain Management  Interventions:   medication offered but refused   rest   quiet environment facilitated   pillow support provided   emotional support   cold applied   care clustered   diversional activity provided   breathing exercises  Taken 2/10/2025 0915 by Piero Barron RN  Pain Management Interventions:   medication offered but refused   rest   pillow support provided   quiet environment facilitated   relaxation techniques promoted   emotional support   cold applied   care clustered   breathing exercises   diversional activity provided  Taken 2/10/2025 0828 by Piero Barron RN  Pain Management Interventions:   pain management plan reviewed with patient/caregiver   medication (see MAR)  Goal: Nausea and Vomiting Relief  Outcome: Progressing  Goal: Effective Urinary Elimination  Outcome: Progressing  Intervention: Monitor and Manage Urinary Retention  Recent Flowsheet Documentation  Taken 2/10/2025 0828 by Piero Barron RN  Urinary Elimination Promotion: toileting offered  Goal: Effective Oxygenation and Ventilation  Outcome: Progressing  Intervention: Optimize Oxygenation and Ventilation  Recent Flowsheet Documentation  Taken 2/10/2025 1712 by Piero Barron RN  Activity Management: up in chair  Taken 2/10/2025 1320 by Piero Barron RN  Activity Management:   ambulated to bathroom   up in chair  Taken 2/10/2025 1200 by Piero Barron RN  Activity Management: up in chair  Taken 2/10/2025 1102 by Piero Barron RN  Activity Management:   ambulated to bathroom   up in chair  Taken 2/10/2025 0915 by Piero Barron RN  Activity Management: up in chair  Taken 2/10/2025 0828 by Piero Barron RN  Cough And Deep Breathing: done with encouragement  Airway/Ventilation Management:   calming measures promoted   pulmonary hygiene promoted  Activity Management: activity encouraged  Head of Bed (HOB) Positioning: HOB at 30-45 degrees

## 2025-02-11 NOTE — PLAN OF CARE
"Pain managed with PRN PO pain meds + cold.  No nausea.  CMS+.  Drsg CDI.  Up SBA belt + walker, LLE WBAT & cam boot on when ambulating.  Voiding.  Virtual RN will review DC instructions with patient and fiance.  Discharge to home with discharge instructions, filled meds (oxycodone, atarax, robaxin, tylenol, ASA, senna, miralax), & personal belongings.  Transport home by fiance.    Goal Outcome Evaluation:    Plan of Care Reviewed With: patient    Overall Patient Progress: improvingOverall Patient Progress: improving    Outcome Evaluation: DC home.    Problem: Adult Inpatient Plan of Care  Goal: Plan of Care Review  Description: The Plan of Care Review/Shift note should be completed every shift.  The Outcome Evaluation is a brief statement about your assessment that the patient is improving, declining, or no change.  This information will be displayed automatically on your shift  note.  Outcome: Adequate for Care Transition  Flowsheets (Taken 2/11/2025 1227)  Outcome Evaluation: DC home.  Plan of Care Reviewed With: patient  Overall Patient Progress: improving  Goal: Patient-Specific Goal (Individualized)  Description: You can add care plan individualizations to a care plan. Examples of Individualization might be:  \"Parent requests to be called daily at 9am for status\", \"I have a hard time hearing out of my right ear\", or \"Do not touch me to wake me up as it startles  me\".  Outcome: Adequate for Care Transition  Goal: Absence of Hospital-Acquired Illness or Injury  Outcome: Adequate for Care Transition  Intervention: Prevent Skin Injury  Recent Flowsheet Documentation  Taken 2/11/2025 1020 by Piero Barron RN  Body Position:   position changed independently   legs elevated  Taken 2/11/2025 0819 by Piero Barron RN  Body Position:   position changed independently   supine, head elevated   legs elevated  Intervention: Prevent and Manage VTE (Venous Thromboembolism) Risk  Recent Flowsheet Documentation  Taken " 2/11/2025 0826 by Piero Barron RN  VTE Prevention/Management:   SCDs off (sequential compression devices)   patient refused intervention  Goal: Optimal Comfort and Wellbeing  Outcome: Adequate for Care Transition  Intervention: Monitor Pain and Promote Comfort  Recent Flowsheet Documentation  Taken 2/11/2025 1213 by Piero Barron RN  Pain Management Interventions: medication (see MAR)  Taken 2/11/2025 1020 by Piero Barron RN  Pain Management Interventions: rest  Taken 2/11/2025 0900 by Piero Barron RN  Pain Management Interventions: rest  Taken 2/11/2025 0819 by Piero Barron RN  Pain Management Interventions:   pain management plan reviewed with patient/caregiver   medication (see MAR)  Goal: Readiness for Transition of Care  Outcome: Adequate for Care Transition     Problem: Surgery Nonspecified  Goal: Absence of Bleeding  Outcome: Adequate for Care Transition  Goal: Effective Bowel Elimination  Outcome: Adequate for Care Transition  Intervention: Enhance Bowel Motility and Elimination  Recent Flowsheet Documentation  Taken 2/11/2025 0826 by Piero Barron RN  Bowel Motility Enhancement:   ambulation promoted   fluid intake encouraged   oral intake encouraged  Goal: Fluid and Electrolyte Balance  Outcome: Adequate for Care Transition  Goal: Blood Glucose Level Within Targeted Range  Outcome: Adequate for Care Transition  Goal: Absence of Infection Signs and Symptoms  Outcome: Adequate for Care Transition  Goal: Anesthesia/Sedation Recovery  Outcome: Adequate for Care Transition  Goal: Optimal Pain Control and Function  Outcome: Adequate for Care Transition  Intervention: Prevent or Manage Pain  Recent Flowsheet Documentation  Taken 2/11/2025 1213 by Piero Barron RN  Pain Management Interventions: medication (see MAR)  Taken 2/11/2025 1020 by Piero Barron RN  Pain Management Interventions: rest  Taken 2/11/2025 0900 by Piero Barron RN  Pain Management Interventions: rest  Taken 2/11/2025 0819 by Anderson  Piero DILLON RN  Pain Management Interventions:   pain management plan reviewed with patient/caregiver   medication (see MAR)  Goal: Nausea and Vomiting Relief  Outcome: Adequate for Care Transition  Goal: Effective Urinary Elimination  Outcome: Adequate for Care Transition  Intervention: Monitor and Manage Urinary Retention  Recent Flowsheet Documentation  Taken 2/11/2025 0826 by Piero Barron RN  Urinary Elimination Promotion: toileting offered  Goal: Effective Oxygenation and Ventilation  Outcome: Adequate for Care Transition  Intervention: Optimize Oxygenation and Ventilation  Recent Flowsheet Documentation  Taken 2/11/2025 1213 by Piero Barron, KAYLA  Activity Management: up in chair  Taken 2/11/2025 1020 by Piero Barron RN  Activity Management: up in chair  Taken 2/11/2025 0826 by Piero Barron RN  Cough And Deep Breathing: done independently per patient  Airway/Ventilation Management:   calming measures promoted   pulmonary hygiene promoted  Taken 2/11/2025 0819 by Piero Barron, KAYLA  Activity Management: activity encouraged  Head of Bed (HOB) Positioning: HOB at 30-45 degrees

## 2025-02-11 NOTE — PLAN OF CARE
"Goal Outcome Evaluation:      Plan of Care Reviewed With: patient    Overall Patient Progress: improvingOverall Patient Progress: improving    Outcome Evaluation: Pt AOx4. RA. Pain managed with scheduled tylenol, PRN atarax, oxycodone and robaxin. Possible discharge home today.    Problem: Adult Inpatient Plan of Care  Goal: Plan of Care Review  Description: The Plan of Care Review/Shift note should be completed every shift.  The Outcome Evaluation is a brief statement about your assessment that the patient is improving, declining, or no change.  This information will be displayed automatically on your shift  note.  Outcome: Progressing  Flowsheets (Taken 2/11/2025 0621)  Outcome Evaluation: Pt AOx4. RA. Pain managed with scheduled tylenol, PRN atarax, oxycodone and robaxin. Possible discharge home today.  Plan of Care Reviewed With: patient  Overall Patient Progress: improving  Goal: Patient-Specific Goal (Individualized)  Description: You can add care plan individualizations to a care plan. Examples of Individualization might be:  \"Parent requests to be called daily at 9am for status\", \"I have a hard time hearing out of my right ear\", or \"Do not touch me to wake me up as it startles  me\".  Outcome: Progressing  Goal: Absence of Hospital-Acquired Illness or Injury  Outcome: Progressing  Intervention: Identify and Manage Fall Risk  Recent Flowsheet Documentation  Taken 2/11/2025 0300 by Taina Barnett, RN  Safety Promotion/Fall Prevention:   activity supervised   assistive device/personal items within reach   clutter free environment maintained   mobility aid in reach   room near nurse's station   safety round/check completed  Intervention: Prevent Skin Injury  Recent Flowsheet Documentation  Taken 2/11/2025 0300 by Taina Barnett, RN  Body Position: supine, head elevated  Skin Protection: adhesive use limited  Intervention: Prevent and Manage VTE (Venous Thromboembolism) Risk  Recent Flowsheet " Documentation  Taken 2/11/2025 0300 by Taina Barnett RN  VTE Prevention/Management: SCDs off (sequential compression devices)  Intervention: Prevent Infection  Recent Flowsheet Documentation  Taken 2/11/2025 0300 by Taina Barnett RN  Infection Prevention:   single patient room provided   rest/sleep promoted   hand hygiene promoted  Goal: Optimal Comfort and Wellbeing  Outcome: Progressing  Goal: Readiness for Transition of Care  Outcome: Progressing     Problem: Surgery Nonspecified  Goal: Absence of Bleeding  Outcome: Progressing  Intervention: Monitor and Manage Bleeding  Recent Flowsheet Documentation  Taken 2/11/2025 0300 by Taina Barnett RN  Bleeding Management: dressing monitored  Goal: Effective Bowel Elimination  Outcome: Progressing  Intervention: Enhance Bowel Motility and Elimination  Recent Flowsheet Documentation  Taken 2/11/2025 0300 by Taina Barnett RN  Bowel Motility Enhancement:   ambulation promoted   fluid intake encouraged  Goal: Fluid and Electrolyte Balance  Outcome: Progressing  Goal: Blood Glucose Level Within Targeted Range  Outcome: Progressing  Goal: Absence of Infection Signs and Symptoms  Outcome: Progressing  Intervention: Prevent or Manage Infection  Recent Flowsheet Documentation  Taken 2/11/2025 0300 by Taina Barnett RN  Infection Management: aseptic technique maintained  Goal: Anesthesia/Sedation Recovery  Outcome: Progressing  Intervention: Optimize Anesthesia Recovery  Recent Flowsheet Documentation  Taken 2/11/2025 0300 by Taina Barnett RN  Safety Promotion/Fall Prevention:   activity supervised   assistive device/personal items within reach   clutter free environment maintained   mobility aid in reach   room near nurse's station   safety round/check completed  Goal: Optimal Pain Control and Function  Outcome: Progressing  Goal: Nausea and Vomiting Relief  Outcome: Progressing  Goal: Effective Urinary  Elimination  Outcome: Progressing  Intervention: Monitor and Manage Urinary Retention  Recent Flowsheet Documentation  Taken 2/11/2025 0300 by Taina Barnett, RN  Urinary Elimination Promotion: toileting offered  Goal: Effective Oxygenation and Ventilation  Outcome: Progressing  Intervention: Optimize Oxygenation and Ventilation  Recent Flowsheet Documentation  Taken 2/11/2025 0300 by Taina Barnett, RN  Cough And Deep Breathing: done independently per patient  Head of Bed (HOB) Positioning: HOB at 20-30 degrees

## 2025-02-11 NOTE — PLAN OF CARE
Occupational Therapy Discharge Summary    Reason for therapy discharge:    All goals and outcomes met, no further needs identified.    Progress towards therapy goal(s). See goals on Care Plan in [facility] electronic health record for goal details.  Goals met    Therapy recommendation(s):    Anticipate pt will discharge home with supervision selfcares and assist with strenuous IADLs (childcare, cooking, cleaning, laundry, transportation, etc). Pt confirms she can have this level of assist at home. Recommend pt have and use tub bench, nonslip shower mat, reacher, wheelchair, walker tray, over the toilet commode.

## (undated) DEVICE — SU VICRYL+ 2-0 CT1 27IN CR UND VCPP42D

## (undated) DEVICE — DRSG XEROFORM 1X8"

## (undated) DEVICE — CAST PADDING 4" UNSTERILE 9044

## (undated) DEVICE — PACK LOWER EXTREMITY RIDGES

## (undated) DEVICE — CAST BUCKET

## (undated) DEVICE — PREP CHLORAPREP 26ML TINTED HI-LITE ORANGE 930815

## (undated) DEVICE — GLOVE BIOGEL PI MICRO INDICATOR UNDERGLOVE SZ 8.0 48980

## (undated) DEVICE — GLOVE BIOGEL PI SZ 6.5 40865

## (undated) DEVICE — LINEN FULL SHEET 5511

## (undated) DEVICE — SU ETHILON 2-0 PS 18" 585H

## (undated) DEVICE — SPONGE LAP 18X18" X8435

## (undated) DEVICE — DRAPE C-ARMOR 5 SIDED 5523

## (undated) DEVICE — Device

## (undated) DEVICE — DRAPE IOBAN INCISE 23X17" 6650EZ

## (undated) DEVICE — SLEEVE DRILL SYN SUPRAPATELLAR 12MM 03.010.437S

## (undated) DEVICE — DRILL BIT QUICK COUPLING 3 FLUTE 4.2MMX145MM NDL  POINT

## (undated) DEVICE — LINEN DRAPE 54X72" 5467

## (undated) DEVICE — IMM KNEE 20"

## (undated) DEVICE — SUTURE VICRYL+ 0 CT-1 18" DYED VIO VCP740D

## (undated) DEVICE — DRSG GAUZE 4X4" TRAY

## (undated) DEVICE — DRAPE STOCKINETTE IMPERVIOUS 12" 1587

## (undated) DEVICE — TOURNIQUET SGL  BLADDER 30"X4" BLUE 5921030135

## (undated) DEVICE — GLOVE BIOGEL PI SZ 8.0 40880

## (undated) DEVICE — CAST PADDING 4" STERILE 9044S

## (undated) DEVICE — WIRE GUIDE 3.2X400MM  357.399

## (undated) DEVICE — BNDG ELASTIC 4" DBL LENGTH UNSTERILE 6611-14

## (undated) DEVICE — DRSG ABDOMINAL 07 1/2X8" 7197D

## (undated) DEVICE — ROD SYN REAMER BALL TIP 2.5X1150MM 351.708S

## (undated) DEVICE — LINEN HALF SHEET 5512

## (undated) DEVICE — BAG CLEAR TRASH 1.3M 39X33" P4040C

## (undated) DEVICE — DRAPE CONVERTORS U-DRAPE 60X72" 8476

## (undated) DEVICE — PAD FOAM TRIANGLE KNEE 193-P

## (undated) DEVICE — DRILL BIT QUICK COUPLING 3 FLUTE 4.2MMX330/100MM CALIBRATE

## (undated) DEVICE — GLOVE BIOGEL PI MICRO INDICATOR UNDERGLOVE SZ 6.5 48965

## (undated) DEVICE — ESU GROUND PAD ADULT W/CORD E7507

## (undated) RX ORDER — LIDOCAINE HYDROCHLORIDE 10 MG/ML
INJECTION, SOLUTION EPIDURAL; INFILTRATION; INTRACAUDAL; PERINEURAL
Status: DISPENSED
Start: 2025-02-06

## (undated) RX ORDER — BUPIVACAINE HYDROCHLORIDE 5 MG/ML
INJECTION, SOLUTION EPIDURAL; INTRACAUDAL
Status: DISPENSED
Start: 2025-02-06

## (undated) RX ORDER — KETOROLAC TROMETHAMINE 30 MG/ML
INJECTION, SOLUTION INTRAMUSCULAR; INTRAVENOUS
Status: DISPENSED
Start: 2025-02-06

## (undated) RX ORDER — PROPOFOL 10 MG/ML
INJECTION, EMULSION INTRAVENOUS
Status: DISPENSED
Start: 2025-02-06

## (undated) RX ORDER — TRIAMCINOLONE ACETONIDE 40 MG/ML
INJECTION, SUSPENSION INTRA-ARTICULAR; INTRAMUSCULAR
Status: DISPENSED
Start: 2025-02-06

## (undated) RX ORDER — FENTANYL CITRATE 50 UG/ML
INJECTION, SOLUTION INTRAMUSCULAR; INTRAVENOUS
Status: DISPENSED
Start: 2025-02-06

## (undated) RX ORDER — ONDANSETRON 2 MG/ML
INJECTION INTRAMUSCULAR; INTRAVENOUS
Status: DISPENSED
Start: 2025-02-06

## (undated) RX ORDER — BUPIVACAINE HYDROCHLORIDE 2.5 MG/ML
INJECTION, SOLUTION EPIDURAL; INFILTRATION; INTRACAUDAL
Status: DISPENSED
Start: 2025-02-06

## (undated) RX ORDER — TRANEXAMIC ACID 10 MG/ML
INJECTION, SOLUTION INTRAVENOUS
Status: DISPENSED
Start: 2025-02-06

## (undated) RX ORDER — GLYCOPYRROLATE 0.2 MG/ML
INJECTION, SOLUTION INTRAMUSCULAR; INTRAVENOUS
Status: DISPENSED
Start: 2025-02-06

## (undated) RX ORDER — DEXAMETHASONE SODIUM PHOSPHATE 4 MG/ML
INJECTION, SOLUTION INTRA-ARTICULAR; INTRALESIONAL; INTRAMUSCULAR; INTRAVENOUS; SOFT TISSUE
Status: DISPENSED
Start: 2025-02-06

## (undated) RX ORDER — CEFAZOLIN SODIUM/WATER 2 G/20 ML
SYRINGE (ML) INTRAVENOUS
Status: DISPENSED
Start: 2025-02-06